# Patient Record
Sex: MALE | Race: WHITE | NOT HISPANIC OR LATINO | Employment: FULL TIME | URBAN - METROPOLITAN AREA
[De-identification: names, ages, dates, MRNs, and addresses within clinical notes are randomized per-mention and may not be internally consistent; named-entity substitution may affect disease eponyms.]

---

## 2017-06-14 ENCOUNTER — RECORDS - HEALTHEAST (OUTPATIENT)
Dept: LAB | Facility: CLINIC | Age: 47
End: 2017-06-14

## 2017-06-14 LAB
AST SERPL W P-5'-P-CCNC: 26 U/L (ref 0–40)
CHOLEST SERPL-MCNC: 278 MG/DL
FASTING STATUS PATIENT QL REPORTED: ABNORMAL
HDLC SERPL-MCNC: 34 MG/DL
LDLC SERPL CALC-MCNC: 209 MG/DL
TRIGL SERPL-MCNC: 176 MG/DL

## 2017-07-20 ENCOUNTER — THERAPY VISIT (OUTPATIENT)
Dept: PHYSICAL THERAPY | Facility: CLINIC | Age: 47
End: 2017-07-20
Payer: OTHER MISCELLANEOUS

## 2017-07-20 DIAGNOSIS — M25.511 ACUTE PAIN OF RIGHT SHOULDER: Primary | ICD-10-CM

## 2017-07-20 PROCEDURE — 97112 NEUROMUSCULAR REEDUCATION: CPT | Mod: GP | Performed by: PHYSICAL THERAPIST

## 2017-07-20 PROCEDURE — 97161 PT EVAL LOW COMPLEX 20 MIN: CPT | Mod: GP | Performed by: PHYSICAL THERAPIST

## 2017-07-20 NOTE — PROGRESS NOTES
[facility] Initial Evaluation    Subjective:    Patient is a 46 year old male presenting with rehab right shoulder hpi. The history is provided by the patient. No  was used.   Fredrick Lockhart is a 46 year old male with a right shoulder condition.  Condition occurred with:  Lifting.  Condition occurred: at work.  This is a new condition  Patient reports onset of right shoulder pain beginning on 6/27/17 when he was lifting a stack of laminations at work that weighed ~20#. He reports that he felt a sharp pain when he got the weight to shoulder level. He reports that he has been wearing a sling for the past 1.5 weeks.  Most recently saw MD for this problem on 6/30/17.    Patient reports pain:  Lateral.    Pain is described as sharp and aching and is intermittent and reported as 1/10 (up to 4/10).  Associated symptoms:  Loss of motion/stiffness and loss of strength. Pain is worse during the day.  Symptoms are exacerbated by using arm overhead, using arm at shoulder level, lifting, certain positions and using arm behind back and relieved by rest and NSAID's.  Since onset symptoms are gradually improving.  Special tests:  X-ray.      General health as reported by patient is good.  Pertinent medical history includes:  Overweight.  Medical allergies: yes (Penicillin).  Other surgeries include:  Other (hernia).  Current medications:  Anti-inflammatory and other (Pravastatin).  Current occupation is   .  Patient is working in normal job with restrictions.  Primary job tasks include:  Prolonged standing, lifting, repetitive tasks, driving and other (assembly).    Barriers include:  None as reported by the patient.    Red flags:  None as reported by the patient.                        Objective:    Standing Alignment:      Shoulder/UE:  Rounded shoulders, scapular abduction L and scapular abduction R                  Flexibility/Screens:     Upper Extremity:    Decreased left upper  extremity flexibility at:  Pectoralis Major    Decreased right upper extremity flexibility present at:  Pectoralis Major                           Shoulder Evaluation:  ROM:  AROM:    Flexion:  Left:  180    Right:  175    Abduction:  Left: 180   Right:  175      External Rotation:  Left:  103    Right:  100            Extension/Internal Rotation:  Left:  T8    Right:  T8          Strength:    Flexion: Left:5/5    Pain: -    Right: 5/5      Pain:  -    Abduction:  Left: 5/5   Pain:-    Right: 5-/5      Pain:+    Internal Rotation:  Left:5/5      Pain:-    Right: 5-/5      Pain:-/+  External Rotation:   Left:5-/5      Pain:-   Right:5-/5      Pain:-/+              Special Tests:  Special tests assessed shoulder: Right: empty can positive; ER lag signs, belly test and lift off negative. All tests negative on left shoulder.    Left shoulder negative for the following special tests:  Impingement  Right shoulder positive for the following special tests:Impingement (Araujo Josh positive; Neer negative)                                       General     ROS    Assessment/Plan:      Patient is a 46 year old male with right side shoulder complaints.    Patient has the following significant findings with corresponding treatment plan.                Diagnosis 1:  Right shoulder pain   P ain -  self management, education and home program  Decreased ROM/flexibility - manual therapy, therapeutic exercise and home program  Decreased strength - therapeutic exercise, therapeutic activities and home program  Impaired muscle performance - neuro re-education and home program  Decreased function - therapeutic activities and home program  Impaired posture - neuro re-education and home program    Therapy Evaluation Codes:   1) History comprised of:   Personal factors that impact the plan of care:      None.    Comorbidity factors that impact the plan of care are:      None.     Medications impacting care: None.  2) Examination of Body  Systems comprised of:   Body structures and functions that impact the plan of care:      Shoulder.   Activity limitations that impact the plan of care are:      Lifting and Reaching.  3) Clinical presentation characteristics are:   Stable/Uncomplicated.  4) Decision-Making    Low complexity using standardized patient assessment instrument and/or measureable assessment of functional outcome.  Cumulative Therapy Evaluation is: Low complexity.    Previous and current functional limitations:  (See Goal Flow Sheet for this information)    Short term and Long term goals: (See Goal Flow Sheet for this information)     Communication ability:  Patient appears to be able to clearly communicate and understand verbal and written communication and follow directions correctly.  Treatment Explanation - The following has been discussed with the patient:   RX ordered/plan of care  Anticipated outcomes  Possible risks and side effects  This patient would benefit from PT intervention to resume normal activities.   Rehab potential is good.    Frequency:  1 X week, once daily  Duration:  for 6 weeks  Discharge Plan:  Achieve all LTG.  Independent in home treatment program.  Reach maximal therapeutic benefit.    Please refer to the daily flowsheet for treatment today, total treatment time and time spent performing 1:1 timed codes.

## 2017-07-20 NOTE — LETTER
[facility] PHYSICAL THERAPY  [address] [address]  [phone]    2017    Re: Fredrick Lockhart   :   1970  MRN:  [id number]   REFERRING PHYSICIAN:   Adelita Sanchez    [facility] PHYSICAL THERAPY    Date of Initial Evaluation:  2017  Visits:  Rxs Used: 1  Reason for Referral:  Acute pain of right shoulder    EVALUATION SUMMARY    [facility] Initial Evaluation    Subjective:  Patient is a 46 year old male presenting with rehab right shoulder hpi. The history is provided by the patient. No  was used. Fredrick Lockhart is a 46 year old male with a right shoulder condition.  Condition occurred with:  Lifting.  Condition occurred: at work.  This is a new condition  Patient reports onset of right shoulder pain beginning on 17 when he was lifting a stack of laminations at work that weighed ~20#. He reports that he felt a sharp pain when he got the weight to shoulder level. He reports that he has been wearing a sling for the past 1.5 weeks.  Most recently saw MD for this problem on 17.  Patient reports pain:  Lateral.    Pain is described as sharp and aching and is intermittent and reported as 1/10 (up to 4/10).  Associated symptoms:  Loss of motion/stiffness and loss of strength. Pain is worse during the day.  Symptoms are exacerbated by using arm overhead, using arm at shoulder level, lifting, certain positions and using arm behind back and relieved by rest and NSAID's.  Since onset symptoms are gradually improving.  Special tests:  X-ray.      General health as reported by patient is good.  Pertinent medical history includes:  Overweight.  Medical allergies: yes (Penicillin).  Other surgeries include:  Other (hernia).  Current medications:  Anti-inflammatory and other (Pravastatin).  Current occupation is .  Patient is working in normal job with restrictions.  Primary job tasks include:  Prolonged standing, lifting,  repetitive tasks, driving and other (assembly).Barriers include:  None as reported by the patient. Red flags:  None as reported by the patient.          Objective:  Standing Alignment:    Shoulder/UE:  Rounded shoulders, scapular abduction L and scapular abduction R  Flexibility/Screens:   Upper Extremity:    Decreased left upper extremity flexibility at:  Pectoralis Major  Decreased right upper extremity flexibility present at:  Pectoralis Major  Shoulder Evaluation:  ROM:  AROM:    Flexion:  Left:  180    Right:  175  Abduction:  Left: 180   Right:  175  External Rotation:  Left:  103    Right:  100  Extension/Internal Rotation:  Left:  T8    Right:  T8    Strength:    Flexion: Left:5/5    Pain: -    Right: 5/5      Pain:  -  Abduction:  Left: 5/5   Pain:-    Right: 5-/5      Pain:+  Internal Rotation:  Left:5/5      Pain:-    Right: 5-/5      Pain:-/+  External Rotation:   Left:5-/5      Pain:-   Right:5-/5      Pain:-/+    Special Tests:  Special tests assessed shoulder: Right: empty can positive; ER lag signs, belly test and lift off negative. All tests negative on left shoulder.  Left shoulder negative for the following special tests:  Impingement  Right shoulder positive for the following special tests:Impingement (Araujo Josh positive; Neer negative)    Assessment/Plan:    Patient is a 46 year old male with right side shoulder complaints.    Patient has the following significant findings with corresponding treatment plan.                Diagnosis 1:  Right shoulder pain   P ain -  self management, education and home program  Decreased ROM/flexibility - manual therapy, therapeutic exercise and home program  Decreased strength - therapeutic exercise, therapeutic activities and home program  Impaired muscle performance - neuro re-education and home program  Decreased function - therapeutic activities and home program  Impaired posture - neuro re-education and home program  Therapy Evaluation Codes:    1) History comprised of:   Personal factors that impact the plan of care:      None.    Comorbidity factors that impact the plan of care are:      None.     Medications impacting care: None.  2) Examination of Body Systems comprised of:   Body structures and functions that impact the plan of care:      Shoulder.   Activity limitations that impact the plan of care are:      Lifting and Reaching.  3) Clinical presentation characteristics are:   Stable/Uncomplicated.  4) Decision-Making    Low complexity using standardized patient assessment instrument and/or measureable assessment of functional outcome.  Cumulative Therapy Evaluation is: Low complexity.  Previous and current functional limitations:  (See Goal Flow Sheet for this information)    Short term and Long term goals: (See Goal Flow Sheet for this information)   Communication ability:  Patient appears to be able to clearly communicate and understand verbal and written communication and follow directions correctly.  Treatment Explanation - The following has been discussed with the patient:   RX ordered/plan of care  Anticipated outcomes  Possible risks and side effects  This patient would benefit from PT intervention to resume normal activities.   Rehab potential is good.  Frequency:  1 X week, once daily  Duration:  for 6 weeks  Discharge Plan:  Achieve all LTG.  Independent in home treatment program.  Reach maximal therapeutic benefit.    Thank you for your referral.    INQUIRIES  Therapist: GEMA Magana [facility] PHYSICAL THERAPY  [address] [address]  Phone: [phone]  Fax: [phone]

## 2017-07-24 ENCOUNTER — TELEPHONE (OUTPATIENT)
Dept: PHYSICAL THERAPY | Facility: CLINIC | Age: 47
End: 2017-07-24

## 2017-07-24 DIAGNOSIS — M25.511 ACUTE PAIN OF RIGHT SHOULDER: ICD-10-CM

## 2017-07-25 ENCOUNTER — THERAPY VISIT (OUTPATIENT)
Dept: PHYSICAL THERAPY | Facility: CLINIC | Age: 47
End: 2017-07-25
Payer: OTHER MISCELLANEOUS

## 2017-07-25 DIAGNOSIS — M25.511 ACUTE PAIN OF RIGHT SHOULDER: ICD-10-CM

## 2017-07-25 PROCEDURE — 97530 THERAPEUTIC ACTIVITIES: CPT | Mod: GP | Performed by: PHYSICAL THERAPIST

## 2017-07-25 PROCEDURE — 97110 THERAPEUTIC EXERCISES: CPT | Mod: GP | Performed by: PHYSICAL THERAPIST

## 2017-08-01 ENCOUNTER — THERAPY VISIT (OUTPATIENT)
Dept: PHYSICAL THERAPY | Facility: CLINIC | Age: 47
End: 2017-08-01
Payer: OTHER MISCELLANEOUS

## 2017-08-01 DIAGNOSIS — M25.511 ACUTE PAIN OF RIGHT SHOULDER: ICD-10-CM

## 2017-08-01 PROCEDURE — 97530 THERAPEUTIC ACTIVITIES: CPT | Mod: GP | Performed by: PHYSICAL THERAPIST

## 2017-08-01 PROCEDURE — 97110 THERAPEUTIC EXERCISES: CPT | Mod: GP | Performed by: PHYSICAL THERAPIST

## 2017-08-08 ENCOUNTER — THERAPY VISIT (OUTPATIENT)
Dept: PHYSICAL THERAPY | Facility: CLINIC | Age: 47
End: 2017-08-08
Payer: OTHER MISCELLANEOUS

## 2017-08-08 DIAGNOSIS — M25.511 ACUTE PAIN OF RIGHT SHOULDER: ICD-10-CM

## 2017-08-08 PROCEDURE — 97530 THERAPEUTIC ACTIVITIES: CPT | Mod: GP | Performed by: PHYSICAL THERAPIST

## 2017-08-08 PROCEDURE — 97110 THERAPEUTIC EXERCISES: CPT | Mod: GP | Performed by: PHYSICAL THERAPIST

## 2017-08-08 NOTE — MR AVS SNAPSHOT
"              After Visit Summary   2017    Fredrick Lockhart    MRN: [id number]           Patient Information     Date Of Birth          1970        Visit Information        Provider Department      2017 7:30 AM Philly Caro PT [facility]        Today's Diagnoses     Acute pain of right shoulder           Follow-ups after your visit        Who to contact     If you have questions or need follow up information about today's clinic visit or your schedule please contact [facility] directly at [phone].  Normal or non-critical lab and imaging results will be communicated to you by [employer], letter or phone within 4 business days after the clinic has received the results. If you do not hear from us within 7 days, please contact the clinic through [employer] or phone. If you have a critical or abnormal lab result, we will notify you by phone as soon as possible.  Submit refill requests through [employer] or call your pharmacy and they will forward the refill request to us. Please allow 3 business days for your refill to be completed.          Additional Information About Your Visit        MyChart Information     [employer] lets you send messages to your doctor, view your test results, renew your prescriptions, schedule appointments and more. To sign up, go to [email or web address] . Click on \"Log in\" on the left side of the screen, which will take you to the Welcome page. Then click on \"Sign up Now\" on the right side of the page.     You will be asked to enter the access code listed below, as well as some personal information. Please follow the directions to create your username and password.     Your access code is: [email or web address]  Expires: 10/23/2017  9:20 AM     Your access code will  in 90 days. If you need help or a new code, please call your [place] clinic or [phone].        Care EveryWhere ID     This is your Care EveryWhere ID. This could be used by " other organizations to access your [employer] medical records  [id number]         Blood Pressure from Last 3 Encounters:   No data found for BP    Weight from Last 3 Encounters:   No data found for Wt              We Performed the Following     BEV PROGRESS NOTES REPORT     THERAPEUTIC ACTIVITIES     THERAPEUTIC EXERCISES        Primary Care Provider Office Phone # Fax #    Dario Mercer [phone]       [facility] [address] [place] [address]        Equal Access to Services     [facility]: Hadii aad ku hadasho Soomaali, waaxda luqadaha, qaybta kaalmada adeegyada, waxay idiin hayaan adeeg kharash la'aan . So [facility] [phone].    ATENCIÓN: Si habla español, tiene a mustafa disposición servicios gratuitos de asistencia lingüística. Llame al [phone].    We comply with applicable federal civil rights laws and [place] laws. We do not discriminate on the basis of race, color, national origin, age, disability sex, sexual orientation or gender identity.            Thank you!     Thank you for choosing [facility]  for your care. Our goal is always to provide you with excellent care. Hearing back from our patients is one way we can continue to improve our services. Please take a few minutes to complete the written survey that you may receive in the mail after your visit with us. Thank you!             Your Updated Medication List - Protect others around you: Learn how to safely use, store and throw away your medicines at www.disposemymeds.org.      Notice  As of 8/8/2017  8:32 AM    You have not been prescribed any medications.

## 2017-08-08 NOTE — PROGRESS NOTES
Subjective:    HPI                    Objective:    System    Physical Exam    General     ROS    Assessment/Plan:      DISCHARGE REPORT    Progress reporting period is from 07/20/17 to 08/08/17.       SUBJECTIVE  Subjective changes noted by patient:  Patient states overall his shoulder feels really well.  Only a little stiffness in the morning. Not on any restrictions at work.  Feels ready to have today be his last day of PT     Current Pain level: 0/10.     Initial Pain level: 4/10.   Changes in function:  Yes (See Goal flowsheet attached for changes in current functional level)  Adverse reaction to treatment or activity: None    OBJECTIVE  Changes noted in objective findings:  Yes, improved AROM and strength  AROM:  flex, abd, ext/IR, hor add, flex/ER all WNL and (-).    PROM all full and (-).    MMT:  shoulder: all 5/5 and (-).    Patient able to lift 20 pounds to upper cupboard with both arms pain free and 10 pounds with right arm overhead to upper cupboard pain free      ASSESSMENT/PLAN  Updated problem list and treatment plan: Diagnosis 1:  Right Shoulder Pain  Decreased function - home program  STG/LTGs have been met or progress has been made towards goals:  Yes (See Goal flow sheet completed today.)  Assessment of Progress: The patient's condition is improving.  Patient is meeting short term goals and is progressing towards long term goals.  Self Management Plans:  Patient has been instructed in a home treatment program.  Patient is independent in a home treatment program.  Patient  has been instructed in self management of symptoms.  Patient is independent in self management of symptoms.  I have re-evaluated this patient and find that the nature, scope, duration and intensity of the therapy is appropriate for the medical condition of the patient.  Fredrick continues to require the following intervention to meet STG and LTG's:  PT intervention is no longer required to meet STG/LTG.    Recommendations:  This  patient is ready to be discharged from therapy and continue their home treatment program.    Please refer to the daily flowsheet for treatment today, total treatment time and time spent performing 1:1 timed codes.

## 2018-06-13 ENCOUNTER — RECORDS - HEALTHEAST (OUTPATIENT)
Dept: LAB | Facility: CLINIC | Age: 48
End: 2018-06-13

## 2018-06-14 LAB
ALBUMIN SERPL-MCNC: 4.2 G/DL (ref 3.5–5)
ALP SERPL-CCNC: 79 U/L (ref 45–120)
ALT SERPL W P-5'-P-CCNC: 29 U/L (ref 0–45)
ANION GAP SERPL CALCULATED.3IONS-SCNC: 10 MMOL/L (ref 5–18)
AST SERPL W P-5'-P-CCNC: 21 U/L (ref 0–40)
BILIRUB SERPL-MCNC: 0.3 MG/DL (ref 0–1)
BUN SERPL-MCNC: 21 MG/DL (ref 8–22)
CALCIUM SERPL-MCNC: 10 MG/DL (ref 8.5–10.5)
CHLORIDE BLD-SCNC: 105 MMOL/L (ref 98–107)
CHOLEST SERPL-MCNC: 235 MG/DL
CO2 SERPL-SCNC: 27 MMOL/L (ref 22–31)
CREAT SERPL-MCNC: 0.95 MG/DL (ref 0.7–1.3)
FASTING STATUS PATIENT QL REPORTED: ABNORMAL
GFR SERPL CREATININE-BSD FRML MDRD: >60 ML/MIN/1.73M2
GLUCOSE BLD-MCNC: 81 MG/DL (ref 70–125)
HDLC SERPL-MCNC: 33 MG/DL
LDLC SERPL CALC-MCNC: 146 MG/DL
POTASSIUM BLD-SCNC: 4.6 MMOL/L (ref 3.5–5)
PROT SERPL-MCNC: 7 G/DL (ref 6–8)
SODIUM SERPL-SCNC: 142 MMOL/L (ref 136–145)
TRIGL SERPL-MCNC: 280 MG/DL

## 2019-02-21 NOTE — TELEPHONE ENCOUNTER
I received notification that WC adjustor Rajwinder at [facility] would authorize 6 PT visits, but was not in agreement with the recommended frequency & duration and wanted the patient to be seen 2x/week for 3 weeks. I spoke with Rajwinder and explained my rationale for the frequency based on how often I felt the patient needed to be seen in clinic (along with participation in home exercises) and that I didn't feel the 3 weeks was a long enough duration for the necessary strengthening and muscle re-training to occur. After discussion, she agreed to 1x/week for 6 weeks duration.     (4) no impairment

## 2020-08-05 ENCOUNTER — SURGERY - HEALTHEAST (OUTPATIENT)
Dept: CARDIOLOGY | Facility: CLINIC | Age: 50
End: 2020-08-05

## 2020-08-05 ASSESSMENT — MIFFLIN-ST. JEOR: SCORE: 1876.84

## 2020-08-06 ASSESSMENT — MIFFLIN-ST. JEOR
SCORE: 1810.16
SCORE: 1866.41

## 2020-08-07 ASSESSMENT — MIFFLIN-ST. JEOR: SCORE: 1805.63

## 2020-08-08 ENCOUNTER — COMMUNICATION - HEALTHEAST (OUTPATIENT)
Dept: SCHEDULING | Facility: CLINIC | Age: 50
End: 2020-08-08

## 2020-08-10 ENCOUNTER — COMMUNICATION - HEALTHEAST (OUTPATIENT)
Dept: CARDIOLOGY | Facility: CLINIC | Age: 50
End: 2020-08-10

## 2020-08-10 ENCOUNTER — AMBULATORY - HEALTHEAST (OUTPATIENT)
Dept: CARDIAC REHAB | Facility: CLINIC | Age: 50
End: 2020-08-10

## 2020-08-10 DIAGNOSIS — I48.91 ATRIAL FIBRILLATION (H): ICD-10-CM

## 2020-08-10 DIAGNOSIS — Z95.5 STENTED CORONARY ARTERY: ICD-10-CM

## 2020-08-11 ENCOUNTER — AMBULATORY - HEALTHEAST (OUTPATIENT)
Dept: CARDIAC REHAB | Facility: HOSPITAL | Age: 50
End: 2020-08-11

## 2020-08-11 DIAGNOSIS — Z95.5 STENTED CORONARY ARTERY: ICD-10-CM

## 2020-08-11 DIAGNOSIS — I20.0 UNSTABLE ANGINA PECTORIS (H): ICD-10-CM

## 2020-08-13 ENCOUNTER — COMMUNICATION - HEALTHEAST (OUTPATIENT)
Dept: CARDIOLOGY | Facility: CLINIC | Age: 50
End: 2020-08-13

## 2020-08-13 ENCOUNTER — RECORDS - HEALTHEAST (OUTPATIENT)
Dept: ADMINISTRATIVE | Facility: OTHER | Age: 50
End: 2020-08-13

## 2020-08-13 ENCOUNTER — AMBULATORY - HEALTHEAST (OUTPATIENT)
Dept: CARDIOLOGY | Facility: CLINIC | Age: 50
End: 2020-08-13

## 2020-08-13 ENCOUNTER — HOSPITAL ENCOUNTER (OUTPATIENT)
Dept: CARDIOLOGY | Facility: HOSPITAL | Age: 50
Discharge: HOME | End: 2020-08-13
Attending: INTERNAL MEDICINE

## 2020-08-13 DIAGNOSIS — I48.91 ATRIAL FIBRILLATION (H): ICD-10-CM

## 2020-08-14 ENCOUNTER — AMBULATORY - HEALTHEAST (OUTPATIENT)
Dept: CARDIAC REHAB | Facility: HOSPITAL | Age: 50
End: 2020-08-14

## 2020-08-14 DIAGNOSIS — I21.11 ST ELEVATION MYOCARDIAL INFARCTION INVOLVING RIGHT CORONARY ARTERY (H): ICD-10-CM

## 2020-08-14 DIAGNOSIS — Z95.5 STENTED CORONARY ARTERY: ICD-10-CM

## 2020-08-17 ENCOUNTER — OFFICE VISIT - HEALTHEAST (OUTPATIENT)
Dept: CARDIOLOGY | Facility: CLINIC | Age: 50
End: 2020-08-17

## 2020-08-17 ENCOUNTER — AMBULATORY - HEALTHEAST (OUTPATIENT)
Dept: CARDIAC REHAB | Facility: HOSPITAL | Age: 50
End: 2020-08-17

## 2020-08-17 DIAGNOSIS — I10 PRIMARY HYPERTENSION: ICD-10-CM

## 2020-08-17 DIAGNOSIS — I10 ESSENTIAL HYPERTENSION: ICD-10-CM

## 2020-08-17 DIAGNOSIS — I21.11 ST ELEVATION MYOCARDIAL INFARCTION INVOLVING RIGHT CORONARY ARTERY (H): ICD-10-CM

## 2020-08-17 DIAGNOSIS — I20.0 UNSTABLE ANGINA PECTORIS (H): ICD-10-CM

## 2020-08-17 DIAGNOSIS — E78.5 DYSLIPIDEMIA: ICD-10-CM

## 2020-08-17 DIAGNOSIS — I25.10 CORONARY ARTERY DISEASE INVOLVING NATIVE CORONARY ARTERY OF NATIVE HEART, ANGINA PRESENCE UNSPECIFIED: ICD-10-CM

## 2020-08-17 DIAGNOSIS — Z95.5 STENTED CORONARY ARTERY: ICD-10-CM

## 2020-08-17 LAB
ANION GAP SERPL CALCULATED.3IONS-SCNC: 10 MMOL/L (ref 5–18)
BUN SERPL-MCNC: 15 MG/DL (ref 8–22)
CALCIUM SERPL-MCNC: 9.6 MG/DL (ref 8.5–10.5)
CHLORIDE BLD-SCNC: 102 MMOL/L (ref 98–107)
CO2 SERPL-SCNC: 26 MMOL/L (ref 22–31)
CREAT SERPL-MCNC: 0.92 MG/DL (ref 0.7–1.3)
GFR SERPL CREATININE-BSD FRML MDRD: >60 ML/MIN/1.73M2
GLUCOSE BLD-MCNC: 103 MG/DL (ref 70–125)
POTASSIUM BLD-SCNC: 4.6 MMOL/L (ref 3.5–5)
SODIUM SERPL-SCNC: 138 MMOL/L (ref 136–145)

## 2020-08-17 RX ORDER — METOPROLOL TARTRATE 25 MG/1
25 TABLET, FILM COATED ORAL 2 TIMES DAILY
Qty: 180 TABLET | Refills: 3 | Status: SHIPPED | OUTPATIENT
Start: 2020-08-17 | End: 2021-09-07

## 2020-08-17 ASSESSMENT — MIFFLIN-ST. JEOR: SCORE: 1808.8

## 2020-08-19 ENCOUNTER — AMBULATORY - HEALTHEAST (OUTPATIENT)
Dept: CARDIAC REHAB | Facility: HOSPITAL | Age: 50
End: 2020-08-19

## 2020-08-19 DIAGNOSIS — Z95.5 STENTED CORONARY ARTERY: ICD-10-CM

## 2020-08-19 DIAGNOSIS — I21.11 ST ELEVATION MYOCARDIAL INFARCTION INVOLVING RIGHT CORONARY ARTERY (H): ICD-10-CM

## 2020-08-21 ENCOUNTER — AMBULATORY - HEALTHEAST (OUTPATIENT)
Dept: CARDIAC REHAB | Facility: HOSPITAL | Age: 50
End: 2020-08-21

## 2020-08-21 DIAGNOSIS — I21.11 ST ELEVATION MYOCARDIAL INFARCTION INVOLVING RIGHT CORONARY ARTERY (H): ICD-10-CM

## 2020-08-21 DIAGNOSIS — Z95.5 STENTED CORONARY ARTERY: ICD-10-CM

## 2020-08-24 ENCOUNTER — AMBULATORY - HEALTHEAST (OUTPATIENT)
Dept: CARDIAC REHAB | Facility: HOSPITAL | Age: 50
End: 2020-08-24

## 2020-08-24 DIAGNOSIS — I21.11 ST ELEVATION MYOCARDIAL INFARCTION INVOLVING RIGHT CORONARY ARTERY (H): ICD-10-CM

## 2020-08-24 DIAGNOSIS — Z95.5 STENTED CORONARY ARTERY: ICD-10-CM

## 2020-08-26 ENCOUNTER — AMBULATORY - HEALTHEAST (OUTPATIENT)
Dept: CARDIAC REHAB | Facility: HOSPITAL | Age: 50
End: 2020-08-26

## 2020-08-26 DIAGNOSIS — I21.11 ST ELEVATION MYOCARDIAL INFARCTION INVOLVING RIGHT CORONARY ARTERY (H): ICD-10-CM

## 2020-08-26 DIAGNOSIS — Z95.5 STENTED CORONARY ARTERY: ICD-10-CM

## 2020-08-31 ENCOUNTER — AMBULATORY - HEALTHEAST (OUTPATIENT)
Dept: CARDIAC REHAB | Facility: HOSPITAL | Age: 50
End: 2020-08-31

## 2020-08-31 DIAGNOSIS — I21.11 ST ELEVATION MYOCARDIAL INFARCTION INVOLVING RIGHT CORONARY ARTERY (H): ICD-10-CM

## 2020-08-31 DIAGNOSIS — Z95.5 STENTED CORONARY ARTERY: ICD-10-CM

## 2020-09-02 ENCOUNTER — AMBULATORY - HEALTHEAST (OUTPATIENT)
Dept: CARDIAC REHAB | Facility: HOSPITAL | Age: 50
End: 2020-09-02

## 2020-09-02 DIAGNOSIS — I21.11 ST ELEVATION MYOCARDIAL INFARCTION INVOLVING RIGHT CORONARY ARTERY (H): ICD-10-CM

## 2020-09-02 DIAGNOSIS — Z95.5 STENTED CORONARY ARTERY: ICD-10-CM

## 2020-09-09 ENCOUNTER — AMBULATORY - HEALTHEAST (OUTPATIENT)
Dept: CARDIAC REHAB | Facility: HOSPITAL | Age: 50
End: 2020-09-09

## 2020-09-09 DIAGNOSIS — Z95.5 STENTED CORONARY ARTERY: ICD-10-CM

## 2020-09-09 DIAGNOSIS — I21.11 ST ELEVATION MYOCARDIAL INFARCTION INVOLVING RIGHT CORONARY ARTERY (H): ICD-10-CM

## 2020-09-14 ENCOUNTER — AMBULATORY - HEALTHEAST (OUTPATIENT)
Dept: CARDIAC REHAB | Facility: HOSPITAL | Age: 50
End: 2020-09-14

## 2020-09-14 DIAGNOSIS — I21.11 ST ELEVATION MYOCARDIAL INFARCTION INVOLVING RIGHT CORONARY ARTERY (H): ICD-10-CM

## 2020-09-14 DIAGNOSIS — Z95.5 STENTED CORONARY ARTERY: ICD-10-CM

## 2020-09-16 ENCOUNTER — AMBULATORY - HEALTHEAST (OUTPATIENT)
Dept: CARDIAC REHAB | Facility: HOSPITAL | Age: 50
End: 2020-09-16

## 2020-09-16 DIAGNOSIS — Z95.5 STENTED CORONARY ARTERY: ICD-10-CM

## 2020-09-16 DIAGNOSIS — I21.11 ST ELEVATION MYOCARDIAL INFARCTION INVOLVING RIGHT CORONARY ARTERY (H): ICD-10-CM

## 2020-09-21 ENCOUNTER — AMBULATORY - HEALTHEAST (OUTPATIENT)
Dept: CARDIAC REHAB | Facility: HOSPITAL | Age: 50
End: 2020-09-21

## 2020-09-21 DIAGNOSIS — Z95.5 STENTED CORONARY ARTERY: ICD-10-CM

## 2020-09-21 DIAGNOSIS — I21.11 ST ELEVATION MYOCARDIAL INFARCTION INVOLVING RIGHT CORONARY ARTERY (H): ICD-10-CM

## 2020-09-23 ENCOUNTER — AMBULATORY - HEALTHEAST (OUTPATIENT)
Dept: CARDIAC REHAB | Facility: HOSPITAL | Age: 50
End: 2020-09-23

## 2020-09-23 DIAGNOSIS — Z95.5 STENTED CORONARY ARTERY: ICD-10-CM

## 2020-09-23 DIAGNOSIS — I21.11 ST ELEVATION MYOCARDIAL INFARCTION INVOLVING RIGHT CORONARY ARTERY (H): ICD-10-CM

## 2020-09-25 ENCOUNTER — COMMUNICATION - HEALTHEAST (OUTPATIENT)
Dept: CARDIOLOGY | Facility: CLINIC | Age: 50
End: 2020-09-25

## 2020-09-28 ENCOUNTER — OFFICE VISIT - HEALTHEAST (OUTPATIENT)
Dept: CARDIOLOGY | Facility: CLINIC | Age: 50
End: 2020-09-28

## 2020-09-28 DIAGNOSIS — I25.10 CORONARY ARTERY DISEASE INVOLVING NATIVE CORONARY ARTERY OF NATIVE HEART WITHOUT ANGINA PECTORIS: ICD-10-CM

## 2020-09-28 ASSESSMENT — MIFFLIN-ST. JEOR: SCORE: 1808.8

## 2020-09-29 ENCOUNTER — AMBULATORY - HEALTHEAST (OUTPATIENT)
Dept: CARDIAC REHAB | Facility: HOSPITAL | Age: 50
End: 2020-09-29

## 2020-09-29 DIAGNOSIS — Z95.5 STENTED CORONARY ARTERY: ICD-10-CM

## 2020-09-29 DIAGNOSIS — I21.11 ST ELEVATION MYOCARDIAL INFARCTION INVOLVING RIGHT CORONARY ARTERY (H): ICD-10-CM

## 2020-10-01 ENCOUNTER — AMBULATORY - HEALTHEAST (OUTPATIENT)
Dept: CARDIAC REHAB | Facility: HOSPITAL | Age: 50
End: 2020-10-01

## 2020-10-01 DIAGNOSIS — I21.11 ST ELEVATION MYOCARDIAL INFARCTION INVOLVING RIGHT CORONARY ARTERY (H): ICD-10-CM

## 2020-10-01 DIAGNOSIS — Z95.5 STENTED CORONARY ARTERY: ICD-10-CM

## 2020-10-06 ENCOUNTER — AMBULATORY - HEALTHEAST (OUTPATIENT)
Dept: CARDIAC REHAB | Facility: HOSPITAL | Age: 50
End: 2020-10-06

## 2020-10-06 DIAGNOSIS — Z95.5 STENTED CORONARY ARTERY: ICD-10-CM

## 2020-10-06 DIAGNOSIS — I21.11 ST ELEVATION MYOCARDIAL INFARCTION INVOLVING RIGHT CORONARY ARTERY (H): ICD-10-CM

## 2020-10-08 ENCOUNTER — AMBULATORY - HEALTHEAST (OUTPATIENT)
Dept: CARDIAC REHAB | Facility: HOSPITAL | Age: 50
End: 2020-10-08

## 2020-10-08 DIAGNOSIS — I21.11 ST ELEVATION MYOCARDIAL INFARCTION INVOLVING RIGHT CORONARY ARTERY (H): ICD-10-CM

## 2020-10-08 DIAGNOSIS — Z95.5 STENTED CORONARY ARTERY: ICD-10-CM

## 2020-10-16 ENCOUNTER — COMMUNICATION - HEALTHEAST (OUTPATIENT)
Dept: SCHEDULING | Facility: CLINIC | Age: 50
End: 2020-10-16

## 2020-11-12 ENCOUNTER — RECORDS - HEALTHEAST (OUTPATIENT)
Dept: LAB | Facility: CLINIC | Age: 50
End: 2020-11-12

## 2020-11-12 LAB
ALBUMIN SERPL-MCNC: 4.5 G/DL (ref 3.5–5)
ALP SERPL-CCNC: 121 U/L (ref 45–120)
ALT SERPL W P-5'-P-CCNC: 128 U/L (ref 0–45)
ANION GAP SERPL CALCULATED.3IONS-SCNC: 8 MMOL/L (ref 5–18)
AST SERPL W P-5'-P-CCNC: 47 U/L (ref 0–40)
BILIRUB SERPL-MCNC: 0.5 MG/DL (ref 0–1)
BUN SERPL-MCNC: 15 MG/DL (ref 8–22)
CALCIUM SERPL-MCNC: 9.5 MG/DL (ref 8.5–10.5)
CHLORIDE BLD-SCNC: 106 MMOL/L (ref 98–107)
CHOLEST SERPL-MCNC: 146 MG/DL
CO2 SERPL-SCNC: 27 MMOL/L (ref 22–31)
CREAT SERPL-MCNC: 1 MG/DL (ref 0.7–1.3)
FASTING STATUS PATIENT QL REPORTED: YES
GFR SERPL CREATININE-BSD FRML MDRD: >60 ML/MIN/1.73M2
GLUCOSE BLD-MCNC: 105 MG/DL (ref 70–125)
HDLC SERPL-MCNC: 33 MG/DL
LDLC SERPL CALC-MCNC: 97 MG/DL
POTASSIUM BLD-SCNC: 4.9 MMOL/L (ref 3.5–5)
PROT SERPL-MCNC: 7.2 G/DL (ref 6–8)
PSA SERPL-MCNC: 0.8 NG/ML (ref 0–3.5)
SODIUM SERPL-SCNC: 141 MMOL/L (ref 136–145)
TRIGL SERPL-MCNC: 82 MG/DL

## 2020-12-29 ENCOUNTER — RECORDS - HEALTHEAST (OUTPATIENT)
Dept: LAB | Facility: CLINIC | Age: 50
End: 2020-12-29

## 2020-12-29 LAB
ALBUMIN SERPL-MCNC: 4.2 G/DL (ref 3.5–5)
ALP SERPL-CCNC: 99 U/L (ref 45–120)
ALT SERPL W P-5'-P-CCNC: 82 U/L (ref 0–45)
AST SERPL W P-5'-P-CCNC: 41 U/L (ref 0–40)
BILIRUB DIRECT SERPL-MCNC: 0.1 MG/DL
BILIRUB SERPL-MCNC: 0.4 MG/DL (ref 0–1)
PROT SERPL-MCNC: 7.2 G/DL (ref 6–8)

## 2021-03-11 ENCOUNTER — AMBULATORY - HEALTHEAST (OUTPATIENT)
Dept: CARDIOLOGY | Facility: CLINIC | Age: 51
End: 2021-03-11

## 2021-03-18 ENCOUNTER — COMMUNICATION - HEALTHEAST (OUTPATIENT)
Dept: CARDIOLOGY | Facility: CLINIC | Age: 51
End: 2021-03-18

## 2021-03-19 ENCOUNTER — OFFICE VISIT - HEALTHEAST (OUTPATIENT)
Dept: CARDIOLOGY | Facility: CLINIC | Age: 51
End: 2021-03-19

## 2021-03-19 DIAGNOSIS — E78.5 DYSLIPIDEMIA: ICD-10-CM

## 2021-03-19 LAB
ALBUMIN SERPL-MCNC: 4.4 G/DL (ref 3.5–5)
ALP SERPL-CCNC: 94 U/L (ref 45–120)
ALT SERPL W P-5'-P-CCNC: 66 U/L (ref 0–45)
AST SERPL W P-5'-P-CCNC: 30 U/L (ref 0–40)
BILIRUB DIRECT SERPL-MCNC: 0.3 MG/DL
BILIRUB SERPL-MCNC: 1 MG/DL (ref 0–1)
CHOLEST SERPL-MCNC: 176 MG/DL
FASTING STATUS PATIENT QL REPORTED: YES
HDLC SERPL-MCNC: 34 MG/DL
LDLC SERPL CALC-MCNC: 114 MG/DL
PROT SERPL-MCNC: 7.7 G/DL (ref 6–8)
TRIGL SERPL-MCNC: 142 MG/DL

## 2021-03-19 ASSESSMENT — MIFFLIN-ST. JEOR: SCORE: 1900.43

## 2021-04-01 ENCOUNTER — AMBULATORY - HEALTHEAST (OUTPATIENT)
Dept: CARDIOLOGY | Facility: CLINIC | Age: 51
End: 2021-04-01

## 2021-04-01 DIAGNOSIS — I25.10 CORONARY ARTERY DISEASE INVOLVING NATIVE CORONARY ARTERY OF NATIVE HEART, ANGINA PRESENCE UNSPECIFIED: ICD-10-CM

## 2021-04-01 DIAGNOSIS — E78.5 DYSLIPIDEMIA: ICD-10-CM

## 2021-04-01 DIAGNOSIS — I21.11 ST ELEVATION MYOCARDIAL INFARCTION INVOLVING RIGHT CORONARY ARTERY (H): ICD-10-CM

## 2021-04-01 RX ORDER — ROSUVASTATIN CALCIUM 20 MG/1
20 TABLET, COATED ORAL AT BEDTIME
Qty: 90 TABLET | Refills: 3 | Status: SHIPPED | OUTPATIENT
Start: 2021-04-01 | End: 2021-09-15

## 2021-05-05 ENCOUNTER — RECORDS - HEALTHEAST (OUTPATIENT)
Dept: LAB | Facility: CLINIC | Age: 51
End: 2021-05-05

## 2021-05-05 LAB
ALBUMIN SERPL-MCNC: 4.4 G/DL (ref 3.5–5)
ALP SERPL-CCNC: 94 U/L (ref 45–120)
ALT SERPL W P-5'-P-CCNC: 57 U/L (ref 0–45)
ANION GAP SERPL CALCULATED.3IONS-SCNC: 8 MMOL/L (ref 5–18)
AST SERPL W P-5'-P-CCNC: 31 U/L (ref 0–40)
BILIRUB SERPL-MCNC: 0.5 MG/DL (ref 0–1)
BUN SERPL-MCNC: 16 MG/DL (ref 8–22)
CALCIUM SERPL-MCNC: 9.4 MG/DL (ref 8.5–10.5)
CHLORIDE BLD-SCNC: 105 MMOL/L (ref 98–107)
CHOLEST SERPL-MCNC: 149 MG/DL
CO2 SERPL-SCNC: 29 MMOL/L (ref 22–31)
CREAT SERPL-MCNC: 0.96 MG/DL (ref 0.7–1.3)
FASTING STATUS PATIENT QL REPORTED: YES
GFR SERPL CREATININE-BSD FRML MDRD: >60 ML/MIN/1.73M2
GLUCOSE BLD-MCNC: 101 MG/DL (ref 70–125)
HDLC SERPL-MCNC: 38 MG/DL
LDLC SERPL CALC-MCNC: 90 MG/DL
POTASSIUM BLD-SCNC: 4.7 MMOL/L (ref 3.5–5)
PROT SERPL-MCNC: 7.3 G/DL (ref 6–8)
SODIUM SERPL-SCNC: 142 MMOL/L (ref 136–145)
TRIGL SERPL-MCNC: 107 MG/DL

## 2021-05-21 ENCOUNTER — RECORDS - HEALTHEAST (OUTPATIENT)
Dept: CARDIOLOGY | Facility: CLINIC | Age: 51
End: 2021-05-21

## 2021-05-21 ENCOUNTER — RECORDS - HEALTHEAST (OUTPATIENT)
Dept: ADMINISTRATIVE | Facility: OTHER | Age: 51
End: 2021-05-21

## 2021-05-24 ENCOUNTER — RECORDS - HEALTHEAST (OUTPATIENT)
Dept: ADMINISTRATIVE | Facility: CLINIC | Age: 51
End: 2021-05-24

## 2021-06-04 VITALS — WEIGHT: 212 LBS | BODY MASS INDEX: 30.42 KG/M2

## 2021-06-04 VITALS
RESPIRATION RATE: 16 BRPM | BODY MASS INDEX: 30.06 KG/M2 | DIASTOLIC BLOOD PRESSURE: 64 MMHG | HEART RATE: 64 BPM | SYSTOLIC BLOOD PRESSURE: 98 MMHG | HEIGHT: 70 IN | WEIGHT: 210 LBS

## 2021-06-04 VITALS — HEIGHT: 70 IN | BODY MASS INDEX: 29.96 KG/M2 | WEIGHT: 209.3 LBS

## 2021-06-04 VITALS — WEIGHT: 211 LBS | BODY MASS INDEX: 30.28 KG/M2

## 2021-06-04 VITALS
HEIGHT: 70 IN | RESPIRATION RATE: 10 BRPM | WEIGHT: 210 LBS | SYSTOLIC BLOOD PRESSURE: 98 MMHG | BODY MASS INDEX: 30.06 KG/M2 | HEART RATE: 56 BPM | DIASTOLIC BLOOD PRESSURE: 54 MMHG

## 2021-06-04 VITALS — BODY MASS INDEX: 29.7 KG/M2 | WEIGHT: 207 LBS

## 2021-06-04 VITALS — WEIGHT: 208 LBS | BODY MASS INDEX: 29.84 KG/M2

## 2021-06-04 VITALS — BODY MASS INDEX: 30.42 KG/M2 | WEIGHT: 212 LBS

## 2021-06-04 VITALS — BODY MASS INDEX: 29.56 KG/M2 | WEIGHT: 206 LBS

## 2021-06-04 VITALS — WEIGHT: 210 LBS | BODY MASS INDEX: 30.13 KG/M2

## 2021-06-04 VITALS — WEIGHT: 206 LBS | BODY MASS INDEX: 29.56 KG/M2

## 2021-06-04 VITALS — WEIGHT: 207 LBS | BODY MASS INDEX: 29.7 KG/M2

## 2021-06-04 VITALS — BODY MASS INDEX: 30.13 KG/M2 | WEIGHT: 210 LBS

## 2021-06-04 VITALS — BODY MASS INDEX: 29.99 KG/M2 | WEIGHT: 209 LBS

## 2021-06-04 VITALS — WEIGHT: 209 LBS | BODY MASS INDEX: 29.99 KG/M2

## 2021-06-05 VITALS
WEIGHT: 230.2 LBS | BODY MASS INDEX: 32.96 KG/M2 | HEIGHT: 70 IN | DIASTOLIC BLOOD PRESSURE: 78 MMHG | SYSTOLIC BLOOD PRESSURE: 126 MMHG | HEART RATE: 64 BPM | RESPIRATION RATE: 16 BRPM

## 2021-06-10 NOTE — TELEPHONE ENCOUNTER
----- Message from Silke Faustin MD sent at 8/7/2020  9:42 AM CDT -----  Fredrick will also need a 30 day monitor after discharge to look for afib.    Thanks, EG

## 2021-06-10 NOTE — PROGRESS NOTES
ITP ASSESSMENT   Assessment Day: Initial    Session Number: 1-2  Precautions: S/P MI    Diagnosis: MI;Stent    Risk Stratification: High    Referring Provider: Norma Betancourt MD  EXERCISE  Exercise Assessment: Initial       6 Minute Walk Test   Pre   Pre Exercise HR: 62                    Pre Exercise BP: 129/78      Peak  Peak HR: 72                   Peak BP: 125/84    Peak feet: 1000    Peak O2 SAT: 97    Peak RPE: 4    Peak MPH: 1.89      Symptoms:  Peak Symptoms: Fatigue      5 mins. 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 MET level goal of 6-7    Education Goals: All goals in this section met    Education Goals Met: Patient can state cardiac s/s and appropriate emergency response.;Has system for taking medication.;Medication review.      Exercise Prescription  Exercise Mode: Treadmill;Bike;Nustep;Arm Erg.;Hallway Walking    Frequency: 2/week    Duration: 40 minutes    Intensity / THR: 20-30 beats above resting heart rate    RPE 11-14  Progression / Met level: 2.5-3.5    Resistive Training?: Yes      Current Exercise (mins/week): 1      Interventions  Home Exercise:  Mode: Walk    Frequency: 3-4x/week    Duration: 20-30 minutes      Education Material : Educational videos;Provide written material;Individual education and counseling;Offer educational classes      Education Completed  Exercise Education Completed: Cardiac Anatomy;Signs and Symptoms;Medication review;RPE;Emergency Plan;Home Exercise;Warm up/cool down;FITT Principles;BP/HR Reponse to exercise;Benefits of Exercise;End point of exercise              Exercise Follow-up/Discharge  Follow up/Discharge: Skilled therapy needed for pt S/P MI to monitor for CV changes with increases in exercise  "intensity, to educate and reinforce risk factor education. Pt will benefit from support from staff to remain tobacco free, quit with event   NUTRITION  Nutrition Assessment: Initial      Nutrition Risk Factors:  Nutrition Risk Factors: Dyslipidemia;Overweight  Cholesterol: 309  LDL: 201  HDL: 31  Triglycerides: 238      Nutrition Plan  Interventions    Other Nutrition Intervention: Diet Class;Educational Videos;Therapist/Pt Discussion;Provide with Written Material      Education Completed  Nutrition Education Completed: Low Saturated fat diet;Risk factor overview;Low sodium diet;Weight management      Goals  Nutrition Goals (Next 30 days): Patient will follow a low sodium diet;Patient will follow a low saturated fat diet;Patient knows appropriate portion size;Patient will lose weight      Goals Met  Nutrition Goals Met: Patient can identify their risk factors for CAD;Completed Nutritional Risk Screen;Provided Rate your Plate Survey;Reviewed Dietitian schedule      Height, Weight, and  BMI  Weight: 210 lb (95.3 kg)  Height: 5' 10\" (1.778 m)  BMI: 30.13      Nutrition Follow-up  Follow-up/Discharge: Completed diet habit survey         Other Risk Factors  Other Risk Factor Assessment: Initial      HTN Risk Factor: Hypertension      Pre Exercise BP: 129/78  Post Exercise BP: 128/77      Hypertension Plan  Goals  HTN Goals: Follow low sodium diet;Exercises regularly      Goals Met  HTN Goals Met: Take medication as prescribed      HTN Interventions  HTN Interventions: Diet consult;Therapist/patient discussion;Provide written material;Offer educational videos;Offer educational classes      HTN Education Completed  HTN Education Completed: Low sodium diet;Medication review;Risk factor overview      Tobacco Risk Factor: Tobacco      Initial Use:: <1 ppd  Current Use:: None      Tobacco Plan  Tobacco Goals  Tobacco Goals: Patient remain tobacco free      Goals Met  Tobacco Goals Met: Patient remain tobacco free      Tobacco " Interventions  Tobacco Interventions: Therapist/patient discussion;Provide written material;Offer educational videos;Offer class on risk factor modification      Tobacco Education Completed  Tobacco Education Completed: Identifies triggers;Health benefits of tobacco cessation;Risk factor overview      Risk Factor Follow-up   Follow-up/Discharge: Quit with event, enc pt to reach out for additional resources if needed     PSYCHOSOCIAL  Psychosocial Assessment: Initial       [facility] Q of L Summary Score: 24      PHQ-9 Total Score: 4      Psychosocial Risk Factor: Stress      Psychosocial Plan  Interventions  If PHQ-9 is >9, send letter to MD  Interventions: Offer Spiritual Care consult;Offer educational videos and classes;Provide written material;Individual education and counseling       Education Completed  Education Completed: Relaxation/Coping Techniques;Effects of stress on body      Goals  Goals (Next 30 days): Practicing stress management skills      Goals Met  Goals Met: Identified Support system;Oriented to stress management classes;Identify stressors      Psychosocial Follow-up  Follow-up/Discharge: Pt reports he typically smokes cigarettes when feeling stressed, will explore other stress mgmt options             Patient involved in Goal setting?: Yes      Signature: _____________________________________________________________    Date: __________________    Time: __________________

## 2021-06-10 NOTE — PATIENT INSTRUCTIONS - HE
Fredrick Lockhart,    It was a pleasure to see you today at the [facility].     My recommendations after this visit include:  - Your lab results will be on MyChart.    - Please schedule an appointment with sleep medicine.     Elisha Duong CNP      Medication     o Take all your medications as prescribed  o Do not stop any medications without talking with a healthcare provider    Exercise      o Physical activity is important for overall health  o Set a goal of 150 minutes of exercise each week  o For example, 30 minutes of exercise 5 days each week.    o These 30 minutes can be broken into shorter periods of 15 minutes twice daily or 10 minutes three times daily  o Start any exercise program slowly and work towards the goal of 150 minutes each week  o For example, you may start with 10 minutes and plan to add a few minutes each week as you get stronger   o Examples of exercise include walking, swimming, or biking  o Remember to stretch and stay hydrated with exercise    Diet     o A heart healthy diet includes:  o A variety of fruits and vegetables  o Whole grains  o Low-fat dairy (fat-free, 1% fat, and low-fat)  o Lean meats and poultry without skin   o Fish (eat fish 2 times each week)  o Nuts  o Limit saturated fat to about 13 grams each day (based on a 2000 calorie diet)  o Limit red meat  o Limit sugars (sweets and sugary beverages)  o Limit your portion sizes  o Do not add salt to your food when cooking or at the table  o Limit alcohol intake (no more than 1 drink each day for women or 2 drinks each day for men)    Weight Loss     o Work on losing weight with diet and exercise  o You BMI (body mass index) should be between 18.5-24.9  o This is a calculation of your weight and height  o Please ask your healthcare provider for your BMI    Manage Other Chronic Health Conditions     o Control cholesterol  o Eat a diet low in saturated fat  o Exercise   o Take a statin medication as  prescribed  o Manage blood pressure  o Eat a diet low in sodium  o Exercise  o Reduce stress  o Lose weight   o Take blood pressure medications as prescribed  o Control blood sugars if diabetic  o Monitor sugars and carbohydrates in your diet  o Lose weight   o Take diabetes medications as prescribed  o Follow-up with your primary care provider to make sure your blood sugars are well controlled    Stress Reduction     o Find time each day to relax  o Reading, listening to music, yoga, meditation, exercise, spending time with friends and family, volunteering   o Get 6-8 hours of sleep each night    Smoking Cessation     o Smoking causes numerous health problems including coronary artery disease  o It is never too late to quit  o Set realistic goals for quitting  o Decrease the number of cigarettes used each week  o Use nicotine gum or patches to help you quit    Information from the American [employer] Association.  Please visit their website at www.heart.org

## 2021-06-10 NOTE — TELEPHONE ENCOUNTER
Wellness Screening Tool  Symptom Screening:  Do you have one of the following NEW symptoms:    Fever (subjective or >100.0)?  No    A new cough?  No    Shortness of breath?  No     Chills? No     New loss of taste or smell? No     Generalized body aches? No     New persistent headache? No     New sore throat? No     Nausea, vomiting, or diarrhea?  No    Within the past 3 weeks, have you been exposed to someone with a known positive illness below:    COVID-19 (known or suspected)?  No    Chicken pox?  No    Mealses?  No    Pertussis?  No    Patient notified of visitor policy- They may have one person accompany them to their appointment, but they will need to wear a mask and will be screened upon arrival for symptoms: Yes  Pt informed to wear a mask: Yes  Pt notified if they develop any symptoms listed above, prior to their appointment, they are to call the clinic directly at [phone] for further instructions.  Yes  Patient's appointment status: Patient will be seen in clinic as scheduled on 8/17

## 2021-06-10 NOTE — PROGRESS NOTES
Fredrick Lockhart has participated in 2 sessions of Phase II Cardiac Rehab.    Progress Report:   Cardiac Rehab Treatment Progress Report 8/6/2020 8/6/2020 8/7/2020 8/11/2020 8/14/2020   Weight 222 lbs 11 oz 210 lbs 5 oz 209 lbs 5 oz 210 lbs 211 lbs   Pre Exercise  HR - - - 62 57   Pre Exercise BP - - - 129/78 146/76   Treadmill Peak HR - - - - 72   Nustep Peak Heart Rate - - - 68 80   Heart Rate - - - 57 62   Post Exercise BP - - - 128/77 118/62   ECG - - - SR, PVCs, NSVT SR;  No ectopy noted today   Total Exercise Minutes - - - 16 35         Current Status:  Currently exercising without complaints or symptoms.    If Physician recommends change in treatment plan, please place orders.        __________________________________________________      _____________  Signature                                                                                                  DateSee Doc Flowsheet

## 2021-06-10 NOTE — PROGRESS NOTES
Cardiac Rehab  Phase II Assessment    Assessment Date: 8/11/20    Diagnosis: STEMI  Date of Onset: 8/5/20  Procedure: PCI with NEREYDA x1 to RCA  Date of Onset: 8/5/20  ICD/Pacemaker: No Parameters: NA  Post-op Complications: NA  ECG History: SR, A fib, NSVT EF%:61  Past Medical History: Sleep apnea, tobacco abuse    Physical Assessment  Precautions/ Physical Limitations: S/P MI  Oxygen: No  O2 Sats: 97 Lung Sounds: Clear Edema: 0  Incisions: NA  Sleeping Pattern: good   Appetite: good   Nutrition Risk Screen: Weight loss    Pain  Intensity: (0-10 scale) 0    Psychosocial/ Emotional Health  1. In the past 12 months, have you been in a relationship where you have been abused physically, emotionally, sexually or financially? No  notified: NA  2. Who do you turn to for emotional support?: Friends, ex wife  3. Do you have cultural or spiritual needs? No  4. Have there been any major life changes in the past 12 months? No    Referral Information  Primary Physician: Vipul López MD  Cardiologist: TBMARCELLE      Home exercise/Equipment: None    Patient's long-term goal(s): See ITP    1. Living Accommodations: Home Steps: Yes      Support people at home: No   2. Marital Status:   3. Family is able to assist with cares      Scientologist/Community involvement: Attends [employer] meetings  4. Recreation/Hobbies: Working on cars, drag racing, fishing

## 2021-06-11 NOTE — TELEPHONE ENCOUNTER
Wellness Screening Tool  Symptom Screening:  Do you have one of the following NEW symptoms:    Fever (subjective or >100.0)?  No    A new cough?  No    Shortness of breath?  No     Chills? No     New loss of taste or smell? No     Generalized body aches? No     New persistent headache? No     New sore throat? No     Nausea, vomiting, or diarrhea?  No    Within the past 2 weeks, have you been exposed to someone with a known positive illness below:    COVID-19 (known or suspected)?  No    Chicken pox?  No    Mealses?  No    Pertussis?  No    Patient notified of visitor policy- They may have one person accompany them to their appointment, but they will need to wear a mask and will be screened upon arrival for symptoms: Yes  Pt informed to wear a mask: Yes  Pt notified if they develop any symptoms listed above, prior to their appointment, they are to call the clinic directly at [phone] for further instructions.  Yes  Patient's appointment status: Patient will be seen in clinic as scheduled on 9/28/20

## 2021-06-11 NOTE — PROGRESS NOTES
ITP ASSESSMENT   Assessment Day: 30 Day    Session Number: 12  Precautions: S/P MI    Diagnosis: MI;Stent    Risk Stratification: High    Referring Provider: Vipul López MD  EXERCISE  Exercise Assessment: Reassessment    Session 12 today, he is progressing nicely. He has established a routine for home exercise and has met with the dietician to help with HTN, HLD and weight. He has not smoked since his heart event. RTW last week is going well with some stress, he attends [employer] as a support system.                                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 MET level goal of 6-7      Education Goals: All goals in this section met    Education Goals Met: Patient can state cardiac s/s and appropriate emergency response.;Has system for taking medication.;Medication review.                          Goals Met  Initial ADL's goals met: Met. Pt is exercising at 3.5-3.8 met level for 40 minutes    Intial Work goals met: Pt has returned to work    Initial Progression: doing well with risk factor modification and home exercise.       Exercise Prescription  Exercise Mode: Treadmill;Bike;Nustep;Arm Erg.;Hallway Walking    Frequency: 2x/week    Duration: 40 min    Intensity / THR: 20-30 beats above resting heart rate (102-136)    RPE 11-14  Progression / Met level: 3.5-5    Resistive Training?: Yes      Current Exercise (mins/week): 220 (he tries to walk daily 20-30 minutes)      Interventions  Home Exercise:  Mode: walk    Frequency: 3-4x/week    Duration: 20-30 min      Education Material : Educational videos;Provide written material;Individual education and counseling;Offer educational classes      Education Completed  Exercise Education Completed: Cardiac Anatomy;Signs and Symptoms;Medication review;RPE;Emergency Plan;Home Exercise;Warm  "up/cool down;FITT Principles;BP/HR Reponse to exercise;Benefits of Exercise;End point of exercise              Exercise Follow-up/Discharge  Follow up/Discharge: Skilled therapy needed for pt S/P MI to monitor for CV changes with increases in exercise intensity, to educate and reinforce risk factor education. Pt will benefit from support from staff to remain tobacco free, quit with event           NUTRITION  Nutrition Assessment: Reassessment      Nutrition Risk Factors:  Nutrition Risk Factors: Dyslipidemia;Overweight  Cholesterol: 309 (8/6/2020)  LDL: 201  HDL: 31  Triglycerides: 238      Nutrition Plan  Interventions  Diet Consult: Completed    Other Nutrition Intervention: Therapist/Pt Discussion      Education Completed  Nutrition Education Completed: Low Saturated fat diet;Low sodium diet      Goals  Nutrition Goals (Next 30 days): Patient will follow a low sodium diet;Patient will follow a low saturated fat diet      Goals Met  Nutrition Goals Met: Patient knows appropriate portion size      Height, Weight, and  BMI  Weight: 212 lb (96.2 kg)  Height: 5' 10\" (1.778 m)  BMI: 30.42      Nutrition Follow-up  Follow-up/Discharge: RD available for questions as needed       Other Risk Factors  Other Risk Factor Assessment: Reassessment      HTN Risk Factor: Hypertension      Pre Exercise BP: 136/64  Post Exercise BP: 108/62      Hypertension Plan  Goals  HTN Goals: Follow low sodium diet;Exercises regularly      Goals Met  HTN Goals Met: Take medication as prescribed      HTN Interventions  HTN Interventions: Diet consult;Therapist/patient discussion;Provide written material;Offer educational videos;Offer educational classes      HTN Education Completed  HTN Education Completed: Low sodium diet;Medication review;Risk factor overview      Tobacco Risk Factor: Tobacco      Initial Use:: <1 ppd  Current Use:: none      Tobacco Plan  Tobacco Goals  Tobacco Goals: Patient remain tobacco free      Goals Met  Tobacco Goals " Met: Patient remain tobacco free      Tobacco Interventions  Tobacco Interventions: Therapist/patient discussion;Provide written material;Offer educational videos;Offer class on risk factor modification      Tobacco Education Completed  Tobacco Education Completed: Identifies triggers;Health benefits of tobacco cessation;Risk factor overview      Risk Factor Follow-up   Follow-up/Discharge: quit on 8/5/2020         PSYCHOSOCIAL  Psychosocial Assessment: Reassessment       [facility] Q of L Summary Score: 24      PHQ-9 Total Score: 4      Psychosocial Risk Factor: Stress      Psychosocial Plan  Interventions  Interventions: Offer Spiritual Care consult;Offer educational videos and classes;Provide written material;Individual education and counseling      Education Completed  Education Completed: Relaxation/Coping Techniques;Effects of stress on body      Goals  Goals (Next 30 days): Practicing stress management skills      Goals Met  Goals Met: Identified Support system;Oriented to stress management classes;Identify stressors      Psychosocial Follow-up  Follow-up/Discharge: Has some work stress and is managing. He belongs to [facility], good support through meetings and books           Patient involved in Goal setting?: Yes      Signature: _____________________________________________________________    Date: __________________    Time: __________________See Doc Flowsheet

## 2021-06-12 NOTE — PATIENT INSTRUCTIONS - HE
Heart Care Rehabilitation Home Exercise Program    Exercise Goal:    Modality Duration Intensity   Warm-up 5 Minutes Slow   Walk 30-40 Minutes OMNI 4-6; 3.6 mph/3%   Bike 30-40 Minutes OmNI 4-6; Level 6   Cool Down 5 Minutes Slow   Strength As Tolerated OMNI 4-6     Target Heart Rate: 102-136 BPM OR 20-30 Beats above Resting HR      OMNI EFFORT SCALE  0  1  2  3 Not tired at all    A little tired     4  5  6  7   Getting more tired    Tired     8  9  10 Really tired    Very, very tired         Special Comments/ Recommendations:  -Lipid Profile with Physician - Cholesterol levels  -Heart Healthy Diet - Low Sodium Diet/Low Fat Diet  -Continue Medications as Prescribed  -Continue Exercise as Tolerated    Stop Exercise!!! If any of the following occur:    Angina/chest pain    Dizziness    Excessive perspiration/cold sweats    Abnormal shortness of breath    Changes in heart rate (slow, fast, irregular)    Sudden fatigue or numbness    Nausea      Also...    Avoid extreme temperatures - exercise indoors if necessary    Wait at least 1 hour after a meal before strenuous activity    Do not exercise if you have a fever or are ill    Wear comfortable, supportive athletic clothing

## 2021-06-29 NOTE — PROGRESS NOTES
Progress Notes by Michelle Poe MD at 9/28/2020 10:30 AM     Author: Michelel Poe MD Service: -- Author Type: Physician    Filed: 10/1/2020  9:54 AM Encounter Date: 9/28/2020 Status: Signed    : Michelle Poe MD (Physician)           Thank you, Dr. López, for asking us to see Fredrick Lockhart at the [facility].      Assessment/Recommendations   Assessment:    1.  Coronary artery disease: Status post inferior myocardial infarction status post PCI of RCA on 8/5/2020  2.  Hyperlipidemia: Severe with an LDL of 238 started on Lipitor 80 mg daily  3.  Heavy smoking quit on 8/5/2020    Plan:  1.  Hypotension and lightheadedness.  We will stop losartan.  He has preserved left ventricular systolic function.  2.  Continue high-dose statin therapy.  He has severe hyperlipidemia and will need repeat lipids.  If lipids are not well controlled with LDL less than 70 will recommend changing to Crestor 40 mg daily.  3.  Continue dual antiplatelet therapy for 1 year following stent placement  4.  Continue cardiac rehab  Follow-up in 3 months     History of Present Illness    Mr. Fredrick Lockhart is a 50 y.o. male with history of heavy smoking, hyperlipidemia, hypertension who suffered an inferior ST elevation myocardial infarction status post PCI of RCA in August 2020 and is here today for follow-up.  He was also found to have nonsustained ventricular tachycardia and post MI atrial fibrillation that converted to normal sinus rhythm hours after his intervention.  He has not had any further known atrial fibrillation.  30-day DELVIS monitor on 9/29/2020 demonstrated no atrial fibrillation and only rare ectopic beats.  Normal sinus rhythm.  He has quit smoking since 8/5 when he had his heart attack.  He still feels mildly fatigued and is participating in cardiac rehab.  At times he also feels lightheaded and his blood pressure has been running low.     Physical Examination Review of  Systems   Vitals:    09/28/20 1036   BP: 98/54   Pulse: (!) 56   Resp: 10     Body mass index is 30.13 kg/m .  Wt Readings from Last 3 Encounters:   09/29/20 207 lb (93.9 kg)   09/28/20 210 lb (95.3 kg)   09/23/20 208 lb (94.3 kg)       General Appearance:   alert, no apparent distress   HEENT:  no scleral icterus; the mucous membranes are pink and moist                                  Neck:  No JVD   Chest: the spine is straight and the chest is symmetric   Lungs:   respirations unlabored; the lungs are clear to auscultation   Cardiovascular:   regular rhythm with normal first and second heart sounds and no murmurs or gallops   Abdomen:  no organomegaly, masses, bruits, or tenderness; bowel sounds are present   Extremities: no cyanosis, clubbing, or edema   Skin: no xanthelasma    General: WNL  Eyes: WNL  Ears/Nose/Throat: WNL  Lungs: Shortness of Breath  Heart: Irregular Heartbeat  Stomach: WNL  Bladder: WNL  Muscle/Joints: WNL  Skin: WNL  Nervous System: Daytime Sleepiness, Dizziness, Loss of Balance  Mental Health: WNL     Blood: WNL     Medical History  Surgical History Family History Social History   Coronary artery disease status post inferior STEMI in August 2020    NSVT    Hypertension    Post MI A. fib Past Surgical History:   Procedure Laterality Date   ? CV CORONARY ANGIOGRAM N/A 8/5/2020    Procedure: Coronary Angiogram;  Surgeon: Norma Betancourt MD;  Location: [facility] Lab;  Service: Cardiology   ? CV LEFT HEART CATHETERIZATION WO LEFT VETRICULOGRAM Left 8/5/2020    Procedure: Left Heart Catheterization Without Left Ventriculogram;  Surgeon: Norma Betancourt MD;  Location: [facility] Cath Lab;  Service: Cardiology    No family history of premature coronary artery disease Social History     Socioeconomic History   ? Marital status:      Spouse name: Not on file   ? Number of children: Not on file   ? Years of education: Not on file   ? Highest education level: Not on file    Occupational History   ? Not on file   Social Needs   ? Financial resource strain: Not on file   ? Food insecurity     Worry: Not on file     Inability: Not on file   ? Transportation needs     Medical: Not on file     Non-medical: Not on file   Tobacco Use   ? Smoking status: Former Smoker     Quit date: 2020     Years since quittin.1   ? Smokeless tobacco: Never Used   Substance and Sexual Activity   ? Alcohol use: Not Currently   ? Drug use: Never   ? Sexual activity: Not on file   Lifestyle   ? Physical activity     Days per week: Not on file     Minutes per session: Not on file   ? Stress: Not on file   Relationships   ? Social connections     Talks on phone: Not on file     Gets together: Not on file     Attends Spiritism service: Not on file     Active member of club or organization: Not on file     Attends meetings of clubs or organizations: Not on file     Relationship status: Not on file   ? Intimate partner violence     Fear of current or ex partner: Not on file     Emotionally abused: Not on file     Physically abused: Not on file     Forced sexual activity: Not on file   Other Topics Concern   ? Not on file   Social History Narrative   ? Not on file          Medications  Allergies   Current Outpatient Medications   Medication Sig Dispense Refill   ? aspirin 81 mg chewable tablet Chew 1 tablet (81 mg total) daily. Take aspirin indefinitely (for the rest of your life). 30 tablet 11   ? atorvastatin (LIPITOR) 80 MG tablet Take 1 tablet (80 mg total) by mouth at bedtime. 30 tablet 11   ? metoprolol tartrate (LOPRESSOR) 25 MG tablet Take 1 tablet (25 mg total) by mouth 2 (two) times a day. 180 tablet 3   ? ticagrelor (BRILINTA) 90 mg Tab Take 1 tablet (90 mg total) by mouth 2 (two) times a day. For 12 months 60 tablet 11     No current facility-administered medications for this visit.       Allergies   Allergen Reactions   ? Penicillins Hives         Lab Results    Chemistry/lipid CBC Cardiac  Enzymes/BNP/TSH/INR   Lab Results   Component Value Date    CHOL 309 (H) 08/06/2020    HDL 31 (L) 08/06/2020    LDLCALC 238 (H) 08/06/2020    TRIG 201 (H) 08/06/2020    CREATININE 0.92 08/17/2020    BUN 15 08/17/2020    K 4.6 08/17/2020     08/17/2020     08/17/2020    CO2 26 08/17/2020    Lab Results   Component Value Date    WBC 12.5 (H) 08/07/2020    HGB 14.8 08/07/2020    HCT 43.4 08/07/2020    MCV 90 08/07/2020     08/07/2020    Lab Results   Component Value Date    TROPONINI 24.96 (HH) 08/07/2020    INR 0.93 08/05/2020

## 2021-06-29 NOTE — PROGRESS NOTES
Progress Notes by Elisha Duong CNP at 8/17/2020  7:50 AM     Author: Elisha Duong CNP Service: -- Author Type: Nurse Practitioner    Filed: 8/17/2020  8:37 AM Encounter Date: 8/17/2020 Status: Signed    : Elisha Duong CNP (Nurse Practitioner)             Assessment/Recommendations   1.  Coronary artery disease: He was hospitalized August 5 to August 7, 2020 with STEMI.  PCI on August 5, 2020 with PTCA and drug-eluting stent to RCA.  Dual antiplatelet therapy is being used with aspirin indefinitely and ticagrelor for 1 year. Puncture site is well-healed with no pain or bruising.      Risk factor modification and lifestyle management topics were discussed including managing comorbidities, weight loss, heart healthy diet, exercise and smoking cessation.  He is participating in cardiac rehab.    BMP pending    2.  Dyslipidemia: Fredrick Lockhart is on high intensity statin therapy with atorvastatin 80 mg daily.  His LDL is 238.  He has a lipid panel scheduled in 2-3 months at primary care office.  We discussed a diet low in saturated fat, weight loss, and exercise along with medication for better control of cholesterol.     3.  Hypertension: His blood pressure is well controlled today taking losartan and metoprolol.    4.  Post MI A. fib: He was not started on anticoagulation.  He is currently wearing a 30-day monitor.  Heart rate is regular today.  No atrial fibrillation at cardiac rehab.    5.  Nonsustained VT: Continue metoprolol    6.  Possible obstructive sleep apnea: This was noted during hospitalization.  Fredrick does snore and has woken up abruptly with a sensation that he stopped breathing.  He is agreeable to sleep medicine referral.     Fredrick will follow up with Dr. Betancourt on September 18    [facility] and term disability paperwork received today.  This will be completed by Dr Betancourt's RN.  Fredrick does not feel ready to return to work at this time since his job requires continual heavy  lifting.        History of Present Illness/Subjective    Fredrick Lockhart is seen at [facility] for post coronary intervention follow up.  He was hospitalized August 5 to August 7, 2020 with STEMI.  PCI on August 5, 2020 with PTCA and drug-eluting stent to RCA.  Dual antiplatelet therapy is being used with aspirin indefinitely and ticagrelor for 1 year.  He had nonsustained VT post MI as well as a few hours of atrial fibrillation.  Echocardiogram on August 6, 2020 showed ejection fraction of 61%.      He denies any chest pain or shortness of breath since PCI.  He is walking daily and tolerating cardiac rehab with no symptoms.  He continues to feel fatigued and takes a nap most days.  This is improving.  He denies lightheadedness and lower extremity edema.      He is no longer smoking and he does not drink alcohol.  He is working on improving his diet.    Echo 8/6/2020:    Left ventricle ejection fraction is normal. The calculated left ventricular ejection fraction is 61%.    Normal right ventricular size and systolic function.    No hemodynamically significant valvular heart abnormalities.    No previous study for comparison.    Results for orders placed during the hospital encounter of 08/05/20   Cardiac Catheterization [CATH01] 08/05/2020    Narrative 48 yo M, presenting with chest pain, initial ECG unremarkable, but   subsequently developed an inferior current of injury. Presented to the ER   with marked HTN, CTA negative for dissection.  Urgent coronary angiography showed  LM mild dz  LAD moderate diffuse dz, no clear obstructive lesions  LCx mild to moderate dz  Ramus large vessel with 70% distal stenosis  RCA large dominant vessel with thrombotic occlusion distally and MING 0   flow beyond that point    Successful PCI of distal RCA with PTCA followed by 3x16 Synergy NEREYDA  0% residual, MING 3 flow post    LVedp 19mmHg        Physical Examination Review of Systems   Vitals:    08/17/20 0759   BP:  98/64   Pulse: 64   Resp: 16     Body mass index is 30.13 kg/m .  Wt Readings from Last 3 Encounters:   08/17/20 210 lb (95.3 kg)   08/14/20 211 lb (95.7 kg)   08/11/20 210 lb (95.3 kg)       General Appearance:     Alert, cooperative and in no acute distress.   ENT/Mouth: membranes moist, no oral lesions or bleeding gums.      EYES:  no scleral icterus, normal conjunctivae   Chest/Lungs:   lungs are clear to auscultation, no rales or wheezing, respirations unlabored   Cardiovascular:   Regular. Normal first and second heart sounds, no edema bilateral lower extremities    Abdomen:  Soft, nontender, nondistended, bowel sounds present   Extremities: no cyanosis or clubbing   Skin:  Neurologic: warm, dry.  mood and affect are appropriate, alert and oriented x3     Puncture Site: Right radial site is soft with no bruising.  Radial pulses and Pedal pulses intact and symmetrical.  CMS intact.        General: WNL  Eyes: WNL  Ears/Nose/Throat: WNL  Lungs: WNL  Heart: Chest Pain, Arm Pain, Shortness of Breath with activity, Irregular Heartbeat  Stomach: WNL  Bladder: WNL  Muscle/Joints: WNL  Skin: WNL  Nervous System: WNL  Mental Health: WNL     Blood: WNL     Medical History  Surgical History Family History Social History   No past medical history on file. Past Surgical History:   Procedure Laterality Date   ? CV CORONARY ANGIOGRAM N/A 8/5/2020    Procedure: Coronary Angiogram;  Surgeon: Norma Betancourt MD;  Location: [facility];  Service: Cardiology   ? CV LEFT HEART CATHETERIZATION WO LEFT VETRICULOGRAM Left 8/5/2020    Procedure: Left Heart Catheterization Without Left Ventriculogram;  Surgeon: Norma Betancourt MD;  Location: [facility];  Service: Cardiology    No family history on file. Social History     Socioeconomic History   ? Marital status:      Spouse name: Not on file   ? Number of children: Not on file   ? Years of education: Not on file   ? Highest education level: Not on file    Occupational History   ? Not on file   Social Needs   ? Financial resource strain: Not on file   ? Food insecurity     Worry: Not on file     Inability: Not on file   ? Transportation needs     Medical: Not on file     Non-medical: Not on file   Tobacco Use   ? Smoking status: Former Smoker     Last attempt to quit: 2020     Years since quittin.0   ? Smokeless tobacco: Never Used   Substance and Sexual Activity   ? Alcohol use: Not Currently   ? Drug use: Never   ? Sexual activity: Not on file   Lifestyle   ? Physical activity     Days per week: Not on file     Minutes per session: Not on file   ? Stress: Not on file   Relationships   ? Social connections     Talks on phone: Not on file     Gets together: Not on file     Attends Protestant service: Not on file     Active member of club or organization: Not on file     Attends meetings of clubs or organizations: Not on file     Relationship status: Not on file   ? Intimate partner violence     Fear of current or ex partner: Not on file     Emotionally abused: Not on file     Physically abused: Not on file     Forced sexual activity: Not on file   Other Topics Concern   ? Not on file   Social History Narrative   ? Not on file          Medications  Allergies   Current Outpatient Medications   Medication Sig Dispense Refill   ? aspirin 81 mg chewable tablet Chew 1 tablet (81 mg total) daily. Take aspirin indefinitely (for the rest of your life). 30 tablet 11   ? atorvastatin (LIPITOR) 80 MG tablet Take 1 tablet (80 mg total) by mouth at bedtime. 30 tablet 11   ? losartan (COZAAR) 25 MG tablet Take 1 tablet (25 mg total) by mouth daily. 90 tablet 3   ? metoprolol tartrate (LOPRESSOR) 25 MG tablet Take 1 tablet (25 mg total) by mouth 2 (two) times a day. 180 tablet 3   ? ticagrelor (BRILINTA) 90 mg Tab Take 1 tablet (90 mg total) by mouth 2 (two) times a day. For 12 months 60 tablet 11     No current facility-administered medications for this visit.     Allergies    Allergen Reactions   ? Penicillins Hives         Lab Results    Chemistry/lipid CBC Cardiac Enzymes/BNP/TSH/INR   Lab Results   Component Value Date    CHOL 309 (H) 08/06/2020    HDL 31 (L) 08/06/2020    LDLCALC 238 (H) 08/06/2020    TRIG 201 (H) 08/06/2020    CREATININE 0.84 08/07/2020    BUN 15 08/07/2020    K 4.2 08/07/2020     08/07/2020     08/07/2020    CO2 24 08/07/2020    Lab Results   Component Value Date    WBC 12.5 (H) 08/07/2020    HGB 14.8 08/07/2020    HCT 43.4 08/07/2020    MCV 90 08/07/2020     08/07/2020    Lab Results   Component Value Date    TROPONINI 24.96 (HH) 08/07/2020    INR 0.93 08/05/2020

## 2021-06-30 NOTE — PROGRESS NOTES
Progress Notes by Michelle Poe MD at 3/19/2021 11:30 AM     Author: Michelle Poe MD Service: -- Author Type: Physician    Filed: 3/19/2021 12:11 PM Encounter Date: 3/19/2021 Status: Signed    : Michelle Poe MD (Physician)           Thank you, Dr. López, for asking us to see Fredrick Lockhart at the [facility].      Assessment/Recommendations   Assessment:    1.  Coronary artery disease: Status post inferior myocardial infarction status post PCI of RCA on 8/5/2020  2.  Hyperlipidemia: Severe with an LDL of 238 improved to LDL of 90s on Lipitor 80 mg daily.  Issues with elevated LFTs and now on 40 mg daily.  3.  Heavy smoking quit on 8/5/2020     Plan:  1.   Check fasting lipids today.  Continue Lipitor 40 mg daily  2.    Continue smoking cessation   3.  Continue dual antiplatelet therapy for 1 year following stent placement we discussed changing Brilinta to Plavix given the slight increase shortness of breath that he has noticed since his intervention however he wishes to continue on Brilinta and finish the year therapy.  4.  Continue diet and exercise  Follow-up in 6 months       History of Present Illness    Mr. Fredrick Lockhart is a 50 y.o. male with history of heavy smoking, hyperlipidemia, hypertension who suffered an inferior ST elevation myocardial infarction status post PCI of RCA in August 2020 and is here today for follow-up.  He was also found to have nonsustained ventricular tachycardia and post MI atrial fibrillation that converted to normal sinus rhythm hours after his intervention.  He has not had any further known atrial fibrillation.  He had issues with Lipitor 80 mg daily and mildly elevated LFTs.  He was off Lipitor for a month and then restarted it at 40 mg daily.  His last cholesterol panel in November was done on high-dose statin therapy.  He has been trying to continue exercise.  He watches his diet and has managed to continue with smoking  cessation since August when he quit.  He feels that he has some slight shortness of breath at times it comes randomly which may be related to Brilinta.       Physical Examination Review of Systems   Vitals:    03/19/21 1118   BP: 126/78   Pulse: 64   Resp: 16     Body mass index is 33.03 kg/m .  Wt Readings from Last 3 Encounters:   03/19/21 (!) 230 lb 3.2 oz (104.4 kg)   10/08/20 210 lb (95.3 kg)   10/06/20 207 lb (93.9 kg)       General Appearance:   alert, no apparent distress   HEENT:  no scleral icterus; the mucous membranes are pink and moist                                  Neck: No jvd   Chest: the spine is straight and the chest is symmetric   Lungs:   respirations unlabored; the lungs are clear to auscultation   Cardiovascular:   regular rhythm with normal first and second heart sounds and no murmurs or gallops   Abdomen:  no organomegaly, masses, bruits, or tenderness; bowel sounds are present   Extremities: no cyanosis, clubbing, or edema   Skin: no xanthelasma    General: Weight Gain  Eyes: WNL  Ears/Nose/Throat: WNL  Lungs: Shortness of Breath  Heart: Shortness of Breath with activity  Stomach: WNL  Bladder: WNL  Muscle/Joints: Joint Pain, Muscle Weakness  Skin: WNL  Nervous System: WNL  Mental Health: WNL     Blood: WNL     Medical History  Surgical History Family History Social History   Coronary artery disease status post inferior STEMI in August 2020     NSVT     Hypertension     Post MI A. fib Past Surgical History:   Procedure Laterality Date   ? CV CORONARY ANGIOGRAM N/A 8/5/2020    Procedure: Coronary Angiogram;  Surgeon: Norma Betancourt MD;  Location: [facility];  Service: Cardiology   ? CV LEFT HEART CATHETERIZATION WO LEFT VETRICULOGRAM Left 8/5/2020    Procedure: Left Heart Catheterization Without Left Ventriculogram;  Surgeon: Norma Betancourt MD;  Location: [facility];  Service: Cardiology    No family history of premature coronary artery disease Social History      Socioeconomic History   ? Marital status:      Spouse name: Not on file   ? Number of children: Not on file   ? Years of education: Not on file   ? Highest education level: Not on file   Occupational History   ? Not on file   Social Needs   ? Financial resource strain: Not on file   ? Food insecurity     Worry: Not on file     Inability: Not on file   ? Transportation needs     Medical: Not on file     Non-medical: Not on file   Tobacco Use   ? Smoking status: Former Smoker     Packs/day: 1.00     Years: 20.00     Pack years: 20.00     Quit date: 2020     Years since quittin.6   ? Smokeless tobacco: Never Used   Substance and Sexual Activity   ? Alcohol use: Not Currently   ? Drug use: Never   ? Sexual activity: Not on file   Lifestyle   ? Physical activity     Days per week: Not on file     Minutes per session: Not on file   ? Stress: Not on file   Relationships   ? Social connections     Talks on phone: Not on file     Gets together: Not on file     Attends Jewish service: Not on file     Active member of club or organization: Not on file     Attends meetings of clubs or organizations: Not on file     Relationship status: Not on file   ? Intimate partner violence     Fear of current or ex partner: Not on file     Emotionally abused: Not on file     Physically abused: Not on file     Forced sexual activity: Not on file   Other Topics Concern   ? Not on file   Social History Narrative   ? Not on file          Medications  Allergies   Current Outpatient Medications   Medication Sig Dispense Refill   ? aspirin 81 mg chewable tablet Chew 1 tablet (81 mg total) daily. Take aspirin indefinitely (for the rest of your life). 30 tablet 11   ? atorvastatin (LIPITOR) 80 MG tablet Take 1 tablet (80 mg total) by mouth at bedtime. (Patient taking differently: Take 40 mg by mouth at bedtime. ) 30 tablet 11   ? metoprolol tartrate (LOPRESSOR) 25 MG tablet Take 1 tablet (25 mg total) by mouth 2 (two) times a  day. 180 tablet 3   ? ticagrelor (BRILINTA) 90 mg Tab Take 1 tablet (90 mg total) by mouth 2 (two) times a day. For 12 months 60 tablet 11     No current facility-administered medications for this visit.       Allergies   Allergen Reactions   ? Penicillins Hives         Lab Results    Chemistry/lipid CBC Cardiac Enzymes/BNP/TSH/INR   Lab Results   Component Value Date    CHOL 146 11/12/2020    HDL 33 (L) 11/12/2020    LDLCALC 97 11/12/2020    TRIG 82 11/12/2020    CREATININE 1.00 11/12/2020    BUN 15 11/12/2020    K 4.9 11/12/2020     11/12/2020     11/12/2020    CO2 27 11/12/2020    Lab Results   Component Value Date    WBC 12.5 (H) 08/07/2020    HGB 14.8 08/07/2020    HCT 43.4 08/07/2020    MCV 90 08/07/2020     08/07/2020    Lab Results   Component Value Date    TROPONINI 24.96 (HH) 08/07/2020    INR 0.93 08/05/2020

## 2021-07-24 ENCOUNTER — HEALTH MAINTENANCE LETTER (OUTPATIENT)
Age: 51
End: 2021-07-24

## 2021-07-24 ENCOUNTER — MYC MEDICAL ADVICE (OUTPATIENT)
Dept: CARDIOLOGY | Facility: CLINIC | Age: 51
End: 2021-07-24

## 2021-07-26 NOTE — TELEPHONE ENCOUNTER
Return MyChart msg sent to patient with clinic scheduling # to call and arrange f/u - msg also sent to scheduling pool.      Msg rec'd 7-23-21 @ 7625:  He is requesting a follow-up, can you help with this.  Michelle Poe MD

## 2021-09-07 DIAGNOSIS — I25.10 CORONARY ARTERY DISEASE INVOLVING NATIVE CORONARY ARTERY OF NATIVE HEART, ANGINA PRESENCE UNSPECIFIED: ICD-10-CM

## 2021-09-07 DIAGNOSIS — I10 PRIMARY HYPERTENSION: ICD-10-CM

## 2021-09-07 RX ORDER — METOPROLOL TARTRATE 25 MG/1
25 TABLET, FILM COATED ORAL 2 TIMES DAILY
Qty: 180 TABLET | Refills: 1 | Status: SHIPPED | OUTPATIENT
Start: 2021-09-07 | End: 2022-03-09

## 2021-09-15 ENCOUNTER — OFFICE VISIT (OUTPATIENT)
Dept: CARDIOLOGY | Facility: CLINIC | Age: 51
End: 2021-09-15
Payer: COMMERCIAL

## 2021-09-15 VITALS
HEIGHT: 70 IN | DIASTOLIC BLOOD PRESSURE: 74 MMHG | RESPIRATION RATE: 12 BRPM | WEIGHT: 244 LBS | HEART RATE: 69 BPM | SYSTOLIC BLOOD PRESSURE: 124 MMHG | BODY MASS INDEX: 34.93 KG/M2

## 2021-09-15 DIAGNOSIS — I25.10 CORONARY ARTERY DISEASE INVOLVING NATIVE CORONARY ARTERY OF NATIVE HEART, ANGINA PRESENCE UNSPECIFIED: ICD-10-CM

## 2021-09-15 DIAGNOSIS — I21.11 ST ELEVATION MYOCARDIAL INFARCTION INVOLVING RIGHT CORONARY ARTERY (H): ICD-10-CM

## 2021-09-15 DIAGNOSIS — E78.5 DYSLIPIDEMIA: ICD-10-CM

## 2021-09-15 PROCEDURE — 99214 OFFICE O/P EST MOD 30 MIN: CPT | Performed by: INTERNAL MEDICINE

## 2021-09-15 RX ORDER — ROSUVASTATIN CALCIUM 40 MG/1
40 TABLET, COATED ORAL AT BEDTIME
Qty: 90 TABLET | Refills: 3 | Status: SHIPPED | OUTPATIENT
Start: 2021-09-15 | End: 2022-09-29

## 2021-09-15 ASSESSMENT — MIFFLIN-ST. JEOR: SCORE: 1958.03

## 2021-09-15 NOTE — PATIENT INSTRUCTIONS
Fredrick IRBY Richy,     It was a pleasure to see you in the office today. My recommendations for you include:   1. Increase crestor to 40 mg daily  2. Check lipids and liver test in two months     Please do not hesitate to call the [facility] with any questions or concerns at [phone].    Sincerely,     Michelle Poe MD

## 2021-09-15 NOTE — LETTER
"9/15/2021    Vipul López MD  [facility] [address]  [facility] [address]    RE: Fredrick Lockhart       Dear Colleague,    I had the pleasure of seeing Fredrick Lockhart in the [facility].      HEART CARE ENCOUNTER CONSULTATON NOTE      [facility]  [phone]      Assessment/Recommendations   Assessment:    1.  Coronary artery disease: Status post inferior myocardial infarction status post PCI of RCA on 8/5/2020  2.  Hyperlipidemia: Severe with an LDL of 238 improved to LDL of 90s on Lipitor 80 mg daily.  Issues with elevated LFTs initially.  Now tolerating statin therapy.  Lipids still not adequately controlled and will increase Crestor dose.  3.  Heavy smoking quit on 8/5/2020     Plan:  1.  Increase Crestor to 40 mg daily, recheck lipids and AST/ALT in a couple months  2.  Continue smoking cessation   3.  Continue aspirin, metoprolol  4.  Continue diet and exercise  Follow-up in 1 year     History of Present Illness/Subjective    HPI: Fredrick Lockhart is a 51 year old male h history of heavy smoking, hyperlipidemia, hypertension who suffered an inferior ST elevation myocardial infarction status post PCI of RCA in August 2020 and is here today for follow-up.  He was also found to have nonsustained ventricular tachycardia and post MI atrial fibrillation that converted to normal sinus rhythm hours after his intervention.  He has not had any further known atrial fibrillation.  He has been doing well since I last saw him.  Feeling good without breathing difficulty or chest pain.  After his Brilinta stopped he did notice improvement in his shortness of breath.       Physical Examination  Review of Systems   Vitals: /74 (BP Location: Left arm, Patient Position: Sitting, Cuff Size: Adult Large)   Pulse 69   Resp 12   Ht 1.778 m (5' 10\")   Wt 110.7 kg (244 lb)   BMI 35.01 kg/m    BMI= Body mass index is 35.01 kg/m .  Wt " Readings from Last 3 Encounters:   09/15/21 110.7 kg (244 lb)   21 104.4 kg (230 lb 3.2 oz)   10/08/20 95.3 kg (210 lb)       General Appearance:   no distress, normal body habitus   ENT/Mouth: membranes moist, no oral lesions or bleeding gums.      EYES:  no scleral icterus, normal conjunctivae   Neck: no carotid bruits or thyromegaly   Chest/Lungs:   lungs are clear to auscultation    Cardiovascular:   regular. Normal first and second heart sounds with no murmurs   no edema bilaterally    Abdomen:  no organomegaly, masses, bruits, or tenderness; bowel sounds are present   Extremities: no cyanosis or clubbing   Skin: no xanthelasma, warm.    Neurologic: normal  bilateral, no tremors     Psychiatric: alert and oriented x3, calm        Please refer above for cardiac ROS details.        Medical History  Surgical History Family History Social History   Coronary artery disease status post inferior STEMI in 2020     NSVT     Hypertension     Post MI A. fib Past Surgical History:   Procedure Laterality Date     CV CORONARY ANGIOGRAM N/A 2020    Procedure: Coronary Angiogram;  Surgeon: Norma Betancourt MD;  Location: [facility] Lab;  Service: Cardiology     CV LEFT HEART CATHETERIZATION WITHOUT LEFT VENTRICULOGRAM Left 2020    Procedure: Left Heart Catheterization Without Left Ventriculogram;  Surgeon: Norma Betancourt MD;  Location: [facility];  Service: Cardiology       No family history of premature coronary artery disease Social History     Socioeconomic History     Marital status:      Spouse name: Not on file     Number of children: Not on file     Years of education: Not on file     Highest education level: Not on file   Occupational History     Not on file   Tobacco Use     Smoking status: Former Smoker     Packs/day: 1.00     Years: 20.00     Pack years: 20.00     Quit date: 2020     Years since quittin.1     Smokeless tobacco: Never Used   Substance and  Sexual Activity     Alcohol use: Not Currently     Drug use: Never     Sexual activity: Not on file   Other Topics Concern     Not on file   Social History Narrative     Not on file     Social Determinants of Health     Financial Resource Strain:      Difficulty of Paying Living Expenses:    Food Insecurity:      Worried About Running Out of Food in the Last Year:      Ran Out of Food in the Last Year:    Transportation Needs:      Lack of Transportation (Medical):      Lack of Transportation (Non-Medical):    Physical Activity:      Days of Exercise per Week:      Minutes of Exercise per Session:    Stress:      Feeling of Stress :    Social Connections:      Frequency of Communication with Friends and Family:      Frequency of Social Gatherings with Friends and Family:      Attends Mormonism Services:      Active Member of Clubs or Organizations:      Attends Club or Organization Meetings:      Marital Status:    Intimate Partner Violence:      Fear of Current or Ex-Partner:      Emotionally Abused:      Physically Abused:      Sexually Abused:            Medications  Allergies   Current Outpatient Medications   Medication Sig Dispense Refill     aspirin 81 mg chewable tablet [ASPIRIN 81 MG CHEWABLE TABLET] Chew 1 tablet (81 mg total) daily. Take aspirin indefinitely (for the rest of your life). 30 tablet 11     metoprolol tartrate (LOPRESSOR) 25 MG tablet Take 1 tablet (25 mg) by mouth 2 times daily 180 tablet 1     rosuvastatin (CRESTOR) 40 MG tablet Take 1 tablet (40 mg) by mouth At Bedtime 90 tablet 3       Allergies   Allergen Reactions     Penicillins Hives          Lab Results    Chemistry/lipid CBC Cardiac Enzymes/BNP/TSH/INR   Recent Labs   Lab Test 05/05/21  1024   CHOL 149   HDL 38*   LDL 90   TRIG 107     Recent Labs   Lab Test 05/05/21  1024 03/19/21  1156 11/12/20  0846   LDL 90 114 97     Recent Labs   Lab Test 05/05/21  1024      POTASSIUM 4.7   CHLORIDE 105   CO2 29      BUN 16   CR  0.96   GFRESTIMATED >60   ANITA 9.4     Recent Labs   Lab Test 05/05/21  1024 11/12/20  0846 08/17/20  0826   CR 0.96 1.00 0.92     No results for input(s): A1C in the last 94329 hours.       Recent Labs   Lab Test 08/07/20  0706   WBC 12.5*   HGB 14.8   HCT 43.4   MCV 90        Recent Labs   Lab Test 08/07/20  0706 08/06/20  0257 08/05/20  1240   HGB 14.8 14.9 16.0    Recent Labs   Lab Test 08/07/20  0706 08/06/20  0257 08/05/20  1537   TROPONINI 24.96* 59.22* 0.10     No results for input(s): BNP, NTBNPI, NTBNP in the last 63645 hours.  No results for input(s): TSH in the last 09672 hours.  Recent Labs   Lab Test 08/05/20  1240   INR 0.93        Michelle Poe MD                                          Thank you for allowing me to participate in the care of your patient.      Sincerely,     Michelle Poe MD     [facility]  cc:   No referring provider defined for this encounter.

## 2021-09-17 NOTE — PROGRESS NOTES
"  HEART CARE ENCOUNTER CONSULTATON NOTE      [facility]  [phone]      Assessment/Recommendations   Assessment:    1.  Coronary artery disease: Status post inferior myocardial infarction status post PCI of RCA on 8/5/2020  2.  Hyperlipidemia: Severe with an LDL of 238 improved to LDL of 90s on Lipitor 80 mg daily.  Issues with elevated LFTs initially.  Now tolerating statin therapy.  Lipids still not adequately controlled and will increase Crestor dose.  3.  Heavy smoking quit on 8/5/2020     Plan:  1.  Increase Crestor to 40 mg daily, recheck lipids and AST/ALT in a couple months  2.  Continue smoking cessation   3.  Continue aspirin, metoprolol  4.  Continue diet and exercise  Follow-up in 1 year     History of Present Illness/Subjective    HPI: Fredrick Lockhart is a 51 year old male h history of heavy smoking, hyperlipidemia, hypertension who suffered an inferior ST elevation myocardial infarction status post PCI of RCA in August 2020 and is here today for follow-up.  He was also found to have nonsustained ventricular tachycardia and post MI atrial fibrillation that converted to normal sinus rhythm hours after his intervention.  He has not had any further known atrial fibrillation.  He has been doing well since I last saw him.  Feeling good without breathing difficulty or chest pain.  After his Brilinta stopped he did notice improvement in his shortness of breath.       Physical Examination  Review of Systems   Vitals: /74 (BP Location: Left arm, Patient Position: Sitting, Cuff Size: Adult Large)   Pulse 69   Resp 12   Ht 1.778 m (5' 10\")   Wt 110.7 kg (244 lb)   BMI 35.01 kg/m    BMI= Body mass index is 35.01 kg/m .  Wt Readings from Last 3 Encounters:   09/15/21 110.7 kg (244 lb)   03/19/21 104.4 kg (230 lb 3.2 oz)   10/08/20 95.3 kg (210 lb)       General Appearance:   no distress, normal body habitus   ENT/Mouth: membranes moist, no oral lesions or bleeding gums.      EYES:  no " scleral icterus, normal conjunctivae   Neck: no carotid bruits or thyromegaly   Chest/Lungs:   lungs are clear to auscultation    Cardiovascular:   regular. Normal first and second heart sounds with no murmurs   no edema bilaterally    Abdomen:  no organomegaly, masses, bruits, or tenderness; bowel sounds are present   Extremities: no cyanosis or clubbing   Skin: no xanthelasma, warm.    Neurologic: normal  bilateral, no tremors     Psychiatric: alert and oriented x3, calm        Please refer above for cardiac ROS details.        Medical History  Surgical History Family History Social History   Coronary artery disease status post inferior STEMI in 2020     NSVT     Hypertension     Post MI A. fib Past Surgical History:   Procedure Laterality Date     CV CORONARY ANGIOGRAM N/A 2020    Procedure: Coronary Angiogram;  Surgeon: Norma Betancourt MD;  Location: [facility];  Service: Cardiology     CV LEFT HEART CATHETERIZATION WITHOUT LEFT VENTRICULOGRAM Left 2020    Procedure: Left Heart Catheterization Without Left Ventriculogram;  Surgeon: Norma Betancourt MD;  Location: [facility];  Service: Cardiology       No family history of premature coronary artery disease Social History     Socioeconomic History     Marital status:      Spouse name: Not on file     Number of children: Not on file     Years of education: Not on file     Highest education level: Not on file   Occupational History     Not on file   Tobacco Use     Smoking status: Former Smoker     Packs/day: 1.00     Years: 20.00     Pack years: 20.00     Quit date: 2020     Years since quittin.1     Smokeless tobacco: Never Used   Substance and Sexual Activity     Alcohol use: Not Currently     Drug use: Never     Sexual activity: Not on file   Other Topics Concern     Not on file   Social History Narrative     Not on file     Social Determinants of Health     Financial Resource Strain:      Difficulty of  Paying Living Expenses:    Food Insecurity:      Worried About Running Out of Food in the Last Year:      Ran Out of Food in the Last Year:    Transportation Needs:      Lack of Transportation (Medical):      Lack of Transportation (Non-Medical):    Physical Activity:      Days of Exercise per Week:      Minutes of Exercise per Session:    Stress:      Feeling of Stress :    Social Connections:      Frequency of Communication with Friends and Family:      Frequency of Social Gatherings with Friends and Family:      Attends Shinto Services:      Active Member of Clubs or Organizations:      Attends Club or Organization Meetings:      Marital Status:    Intimate Partner Violence:      Fear of Current or Ex-Partner:      Emotionally Abused:      Physically Abused:      Sexually Abused:            Medications  Allergies   Current Outpatient Medications   Medication Sig Dispense Refill     aspirin 81 mg chewable tablet [ASPIRIN 81 MG CHEWABLE TABLET] Chew 1 tablet (81 mg total) daily. Take aspirin indefinitely (for the rest of your life). 30 tablet 11     metoprolol tartrate (LOPRESSOR) 25 MG tablet Take 1 tablet (25 mg) by mouth 2 times daily 180 tablet 1     rosuvastatin (CRESTOR) 40 MG tablet Take 1 tablet (40 mg) by mouth At Bedtime 90 tablet 3       Allergies   Allergen Reactions     Penicillins Hives          Lab Results    Chemistry/lipid CBC Cardiac Enzymes/BNP/TSH/INR   Recent Labs   Lab Test 05/05/21  1024   CHOL 149   HDL 38*   LDL 90   TRIG 107     Recent Labs   Lab Test 05/05/21  1024 03/19/21  1156 11/12/20  0846   LDL 90 114 97     Recent Labs   Lab Test 05/05/21  1024      POTASSIUM 4.7   CHLORIDE 105   CO2 29      BUN 16   CR 0.96   GFRESTIMATED >60   ANITA 9.4     Recent Labs   Lab Test 05/05/21  1024 11/12/20  0846 08/17/20  0826   CR 0.96 1.00 0.92     No results for input(s): A1C in the last 54822 hours.       Recent Labs   Lab Test 08/07/20  0706   WBC 12.5*   HGB 14.8   HCT 43.4   MCV  90        Recent Labs   Lab Test 08/07/20  0706 08/06/20  0257 08/05/20  1240   HGB 14.8 14.9 16.0    Recent Labs   Lab Test 08/07/20  0706 08/06/20  0257 08/05/20  1537   TROPONINI 24.96* 59.22* 0.10     No results for input(s): BNP, NTBNPI, NTBNP in the last 42269 hours.  No results for input(s): TSH in the last 06226 hours.  Recent Labs   Lab Test 08/05/20  1240   INR 0.93        Michelle Poe MD

## 2021-09-18 ENCOUNTER — HEALTH MAINTENANCE LETTER (OUTPATIENT)
Age: 51
End: 2021-09-18

## 2021-11-15 ENCOUNTER — LAB (OUTPATIENT)
Dept: CARDIOLOGY | Facility: CLINIC | Age: 51
End: 2021-11-15
Payer: COMMERCIAL

## 2021-11-15 DIAGNOSIS — E78.5 DYSLIPIDEMIA: ICD-10-CM

## 2021-11-15 LAB
ALT SERPL W P-5'-P-CCNC: 45 U/L (ref 0–45)
AST SERPL W P-5'-P-CCNC: 29 U/L (ref 0–40)
CHOLEST SERPL-MCNC: 160 MG/DL
FASTING STATUS PATIENT QL REPORTED: YES
HDLC SERPL-MCNC: 47 MG/DL
LDLC SERPL CALC-MCNC: 91 MG/DL
TRIGL SERPL-MCNC: 109 MG/DL

## 2021-11-15 PROCEDURE — 80061 LIPID PANEL: CPT

## 2021-11-15 PROCEDURE — 84460 ALANINE AMINO (ALT) (SGPT): CPT

## 2021-11-15 PROCEDURE — 36415 COLL VENOUS BLD VENIPUNCTURE: CPT

## 2021-11-15 PROCEDURE — 84450 TRANSFERASE (AST) (SGOT): CPT

## 2021-11-17 DIAGNOSIS — I10 ESSENTIAL HYPERTENSION: ICD-10-CM

## 2021-11-17 DIAGNOSIS — I21.11 ST ELEVATION MYOCARDIAL INFARCTION INVOLVING RIGHT CORONARY ARTERY (H): Primary | ICD-10-CM

## 2021-11-17 DIAGNOSIS — I48.91 ATRIAL FIBRILLATION (H): ICD-10-CM

## 2021-11-17 DIAGNOSIS — E78.5 DYSLIPIDEMIA: ICD-10-CM

## 2021-11-17 DIAGNOSIS — I25.10 CORONARY ARTERY DISEASE INVOLVING NATIVE CORONARY ARTERY OF NATIVE HEART WITHOUT ANGINA PECTORIS: ICD-10-CM

## 2022-01-10 ENCOUNTER — TRANSFERRED RECORDS (OUTPATIENT)
Dept: HEALTH INFORMATION MANAGEMENT | Facility: CLINIC | Age: 52
End: 2022-01-10

## 2022-03-08 DIAGNOSIS — I25.10 CORONARY ARTERY DISEASE INVOLVING NATIVE CORONARY ARTERY OF NATIVE HEART: ICD-10-CM

## 2022-03-08 DIAGNOSIS — I10 PRIMARY HYPERTENSION: ICD-10-CM

## 2022-03-09 RX ORDER — METOPROLOL TARTRATE 25 MG/1
TABLET, FILM COATED ORAL
Qty: 180 TABLET | Refills: 1 | Status: SHIPPED | OUTPATIENT
Start: 2022-03-09 | End: 2022-09-29

## 2022-08-14 ENCOUNTER — HEALTH MAINTENANCE LETTER (OUTPATIENT)
Age: 52
End: 2022-08-14

## 2022-11-03 ENCOUNTER — OFFICE VISIT (OUTPATIENT)
Dept: CARDIOLOGY | Facility: CLINIC | Age: 52
End: 2022-11-03
Payer: COMMERCIAL

## 2022-11-03 VITALS
WEIGHT: 241.2 LBS | BODY MASS INDEX: 34.53 KG/M2 | DIASTOLIC BLOOD PRESSURE: 82 MMHG | HEIGHT: 70 IN | SYSTOLIC BLOOD PRESSURE: 136 MMHG | RESPIRATION RATE: 16 BRPM | HEART RATE: 60 BPM

## 2022-11-03 DIAGNOSIS — I25.10 CORONARY ARTERY DISEASE INVOLVING NATIVE CORONARY ARTERY OF NATIVE HEART WITHOUT ANGINA PECTORIS: ICD-10-CM

## 2022-11-03 DIAGNOSIS — E78.5 DYSLIPIDEMIA: ICD-10-CM

## 2022-11-03 DIAGNOSIS — I10 ESSENTIAL HYPERTENSION: ICD-10-CM

## 2022-11-03 DIAGNOSIS — G47.33 OSA (OBSTRUCTIVE SLEEP APNEA): Primary | ICD-10-CM

## 2022-11-03 DIAGNOSIS — I21.11 ST ELEVATION MYOCARDIAL INFARCTION INVOLVING RIGHT CORONARY ARTERY (H): ICD-10-CM

## 2022-11-03 LAB — LDLC SERPL DIRECT ASSAY-MCNC: 84 MG/DL

## 2022-11-03 PROCEDURE — 99214 OFFICE O/P EST MOD 30 MIN: CPT | Performed by: INTERNAL MEDICINE

## 2022-11-03 PROCEDURE — 36415 COLL VENOUS BLD VENIPUNCTURE: CPT | Performed by: INTERNAL MEDICINE

## 2022-11-03 PROCEDURE — 83721 ASSAY OF BLOOD LIPOPROTEIN: CPT | Performed by: INTERNAL MEDICINE

## 2022-11-03 RX ORDER — METOPROLOL SUCCINATE 50 MG/1
50 TABLET, EXTENDED RELEASE ORAL DAILY
Qty: 90 TABLET | Refills: 3 | Status: SHIPPED | OUTPATIENT
Start: 2022-11-03 | End: 2022-11-21

## 2022-11-03 NOTE — LETTER
11/3/2022    Vipul López MD  [facility] [address]  [facility] [address]    RE: Fredrick Lockhart       Dear Colleague,     I had the pleasure of seeing Fredrick Lockhart in the [facility].    HEART CARE ENCOUNTER CONSULTATON NOTE      [facility]  [phone]      Assessment/Recommendations   Assessment:    1.  Coronary artery disease: Status post inferior myocardial infarction status post PCI of RCA on 8/5/2020  2.  Hyperlipidemia: Severe with an LDL of 238 improved to LDL of 90s on Crestor 40 mg daily.  We will repeat direct LDL today.  Goal LDL is less than 70.  3.  Heavy smoking quit on 8/5/2020  4.  Recurrent palpitations occurring almost daily towards the end of the day and will proceed with a 14-day event monitor for further evaluation.  5.  Possible EBONY with daytime fatigue     Plan:  1.  Continue Crestor 40 mg daily.  Will check direct LDL today.  Consider adding Zetia if LDL still not within goals  2.  Continue smoking cessation   3.  Continue aspirin.  We will change metoprolol dosing to Toprol-XL 50 mg daily from metoprolol tartrate 25 mg twice daily.  4.  Continue diet and exercise  5.  Sleep study evaluation  Follow-up in 1 year       History of Present Illness/Subjective    HPI: Fredrick Lockhart is a 52 year old male  history of heavy smoking, hyperlipidemia, hypertension who suffered an inferior ST elevation myocardial infarction status post PCI of RCA in August 2020 and is here today for follow-up.  He was also found to have nonsustained ventricular tachycardia and post MI atrial fibrillation that converted to normal sinus rhythm hours after his intervention.  He has not had any further known atrial fibrillation.      Patient today reports that towards the end of his 12-hour shift he is noticing an irregular heartbeat and skipped heartbeats.  He works in [place] and lives in [place].  He does not have much time to exercise and admits to lack of  "exercise.  Contributing to this he had right carpal tunnel surgery and is going to be undergoing left carpal tunnel surgery shortly.  Denies any problems of shortness of breath or chest pain.  He reports compliance with medications.       Physical Examination  Review of Systems   Vitals: /82 (BP Location: Right arm, Patient Position: Sitting, Cuff Size: Adult Large)   Pulse 60   Resp 16   Ht 1.778 m (5' 10\")   Wt 109.4 kg (241 lb 3.2 oz)   BMI 34.61 kg/m    BMI= Body mass index is 34.61 kg/m .  Wt Readings from Last 3 Encounters:   11/03/22 109.4 kg (241 lb 3.2 oz)   09/15/21 110.7 kg (244 lb)   03/19/21 104.4 kg (230 lb 3.2 oz)       General Appearance:   no distress, normal body habitus   ENT/Mouth: membranes moist, no oral lesions or bleeding gums.      EYES:  no scleral icterus, normal conjunctivae   Neck: no carotid bruits or thyromegaly   Chest/Lungs:   lungs are clear to auscultation   Cardiovascular:   Regular. Normal first and second heart sounds with no murmurs  no edema bilaterally    Abdomen:  no organomegaly, masses, bruits, or tenderness; bowel sounds are present   Extremities: no cyanosis or clubbing   Skin: no xanthelasma, warm.    Neurologic: normal  bilateral, no tremors     Psychiatric: alert and oriented x3, calm        Please refer above for cardiac ROS details.        Medical History  Surgical History Family History Social History   Coronary artery disease  Dyslipidemia Past Surgical History:   Procedure Laterality Date     CV CORONARY ANGIOGRAM N/A 8/5/2020    Procedure: Coronary Angiogram;  Surgeon: Norma Betancourt MD;  Location: [facility];  Service: Cardiology     CV LEFT HEART CATHETERIZATION WITHOUT LEFT VENTRICULOGRAM Left 8/5/2020    Procedure: Left Heart Catheterization Without Left Ventriculogram;  Surgeon: Norma Betancourt MD;  Location: [facility];  Service: Cardiology     No significant family history of heart disease   Social History "     Socioeconomic History     Marital status:      Spouse name: Not on file     Number of children: Not on file     Years of education: Not on file     Highest education level: Not on file   Occupational History     Not on file   Tobacco Use     Smoking status: Former     Packs/day: 1.00     Years: 20.00     Pack years: 20.00     Types: Cigarettes     Quit date: 2020     Years since quittin.2     Smokeless tobacco: Never   Vaping Use     Vaping Use: Never used   Substance and Sexual Activity     Alcohol use: Not Currently     Drug use: Never     Sexual activity: Not on file   Other Topics Concern     Not on file   Social History Narrative     Not on file     Social Determinants of Health     Financial Resource Strain: Not on file   Food Insecurity: Not on file   Transportation Needs: Not on file   Physical Activity: Not on file   Stress: Not on file   Social Connections: Not on file   Intimate Partner Violence: Not on file   Housing Stability: Not on file           Medications  Allergies   Current Outpatient Medications   Medication Sig Dispense Refill     aspirin 81 mg chewable tablet [ASPIRIN 81 MG CHEWABLE TABLET] Chew 1 tablet (81 mg total) daily. Take aspirin indefinitely (for the rest of your life). 30 tablet 11     metoprolol succinate ER (TOPROL XL) 50 MG 24 hr tablet Take 1 tablet (50 mg) by mouth daily 90 tablet 3     rosuvastatin (CRESTOR) 40 MG tablet TAKE 1 TABLET(40 MG) BY MOUTH AT BEDTIME 90 tablet 1       Allergies   Allergen Reactions     Penicillins Hives          Lab Results    Chemistry/lipid CBC Cardiac Enzymes/BNP/TSH/INR   Recent Labs   Lab Test 11/15/21  1603   CHOL 160   HDL 47   LDL 91   TRIG 109     Recent Labs   Lab Test 11/15/21  1603 21  1024 21  1156   LDL 91 90 114     Recent Labs   Lab Test 21  1024      POTASSIUM 4.7   CHLORIDE 105   CO2 29      BUN 16   CR 0.96   GFRESTIMATED >60   ANITA 9.4     Recent Labs   Lab Test 21  1024  11/12/20  0846 08/17/20  0826   CR 0.96 1.00 0.92     No results for input(s): A1C in the last 83015 hours.       Recent Labs   Lab Test 08/07/20  0706   WBC 12.5*   HGB 14.8   HCT 43.4   MCV 90        Recent Labs   Lab Test 08/07/20  0706 08/06/20  0257 08/05/20  1240   HGB 14.8 14.9 16.0    Recent Labs   Lab Test 08/07/20  0706 08/06/20  0257 08/05/20  1537   TROPONINI 24.96* 59.22* 0.10     No results for input(s): BNP, NTBNPI, NTBNP in the last 19953 hours.  No results for input(s): TSH in the last 76499 hours.  Recent Labs   Lab Test 08/05/20  1240   INR 0.93        Michelle Poe MD    Thank you for allowing me to participate in the care of your patient.      Sincerely,     Michelle Poe MD     [facility]  cc:   Michelle Poe MD  [address]  [address]  [place] [address]

## 2022-11-03 NOTE — PROGRESS NOTES
HEART CARE ENCOUNTER CONSULTATON NOTE      [facility]  [phone]      Assessment/Recommendations   Assessment:    1.  Coronary artery disease: Status post inferior myocardial infarction status post PCI of RCA on 8/5/2020  2.  Hyperlipidemia: Severe with an LDL of 238 improved to LDL of 90s on Crestor 40 mg daily.  We will repeat direct LDL today.  Goal LDL is less than 70.  3.  Heavy smoking quit on 8/5/2020  4.  Recurrent palpitations occurring almost daily towards the end of the day and will proceed with a 14-day event monitor for further evaluation.  5.  Possible EBONY with daytime fatigue     Plan:  1.  Continue Crestor 40 mg daily.  Will check direct LDL today.  Consider adding Zetia if LDL still not within goals  2.  Continue smoking cessation   3.  Continue aspirin.  We will change metoprolol dosing to Toprol-XL 50 mg daily from metoprolol tartrate 25 mg twice daily.  4.  Continue diet and exercise  5.  Sleep study evaluation  Follow-up in 1 year       History of Present Illness/Subjective    HPI: Fredrick Lockhart is a 52 year old male  history of heavy smoking, hyperlipidemia, hypertension who suffered an inferior ST elevation myocardial infarction status post PCI of RCA in August 2020 and is here today for follow-up.  He was also found to have nonsustained ventricular tachycardia and post MI atrial fibrillation that converted to normal sinus rhythm hours after his intervention.  He has not had any further known atrial fibrillation.      Patient today reports that towards the end of his 12-hour shift he is noticing an irregular heartbeat and skipped heartbeats.  He works in [place] and lives in [place].  He does not have much time to exercise and admits to lack of exercise.  Contributing to this he had right carpal tunnel surgery and is going to be undergoing left carpal tunnel surgery shortly.  Denies any problems of shortness of breath or chest pain.  He reports compliance with  "medications.       Physical Examination  Review of Systems   Vitals: /82 (BP Location: Right arm, Patient Position: Sitting, Cuff Size: Adult Large)   Pulse 60   Resp 16   Ht 1.778 m (5' 10\")   Wt 109.4 kg (241 lb 3.2 oz)   BMI 34.61 kg/m    BMI= Body mass index is 34.61 kg/m .  Wt Readings from Last 3 Encounters:   11/03/22 109.4 kg (241 lb 3.2 oz)   09/15/21 110.7 kg (244 lb)   03/19/21 104.4 kg (230 lb 3.2 oz)       General Appearance:   no distress, normal body habitus   ENT/Mouth: membranes moist, no oral lesions or bleeding gums.      EYES:  no scleral icterus, normal conjunctivae   Neck: no carotid bruits or thyromegaly   Chest/Lungs:   lungs are clear to auscultation   Cardiovascular:   Regular. Normal first and second heart sounds with no murmurs  no edema bilaterally    Abdomen:  no organomegaly, masses, bruits, or tenderness; bowel sounds are present   Extremities: no cyanosis or clubbing   Skin: no xanthelasma, warm.    Neurologic: normal  bilateral, no tremors     Psychiatric: alert and oriented x3, calm        Please refer above for cardiac ROS details.        Medical History  Surgical History Family History Social History   Coronary artery disease  Dyslipidemia Past Surgical History:   Procedure Laterality Date     CV CORONARY ANGIOGRAM N/A 8/5/2020    Procedure: Coronary Angiogram;  Surgeon: Norma Betancourt MD;  Location: [facility];  Service: Cardiology     CV LEFT HEART CATHETERIZATION WITHOUT LEFT VENTRICULOGRAM Left 8/5/2020    Procedure: Left Heart Catheterization Without Left Ventriculogram;  Surgeon: Norma Betancourt MD;  Location: [facility];  Service: Cardiology     No significant family history of heart disease   Social History     Socioeconomic History     Marital status:      Spouse name: Not on file     Number of children: Not on file     Years of education: Not on file     Highest education level: Not on file   Occupational History     Not on " file   Tobacco Use     Smoking status: Former     Packs/day: 1.00     Years: 20.00     Pack years: 20.00     Types: Cigarettes     Quit date: 2020     Years since quittin.2     Smokeless tobacco: Never   Vaping Use     Vaping Use: Never used   Substance and Sexual Activity     Alcohol use: Not Currently     Drug use: Never     Sexual activity: Not on file   Other Topics Concern     Not on file   Social History Narrative     Not on file     Social Determinants of Health     Financial Resource Strain: Not on file   Food Insecurity: Not on file   Transportation Needs: Not on file   Physical Activity: Not on file   Stress: Not on file   Social Connections: Not on file   Intimate Partner Violence: Not on file   Housing Stability: Not on file           Medications  Allergies   Current Outpatient Medications   Medication Sig Dispense Refill     aspirin 81 mg chewable tablet [ASPIRIN 81 MG CHEWABLE TABLET] Chew 1 tablet (81 mg total) daily. Take aspirin indefinitely (for the rest of your life). 30 tablet 11     metoprolol succinate ER (TOPROL XL) 50 MG 24 hr tablet Take 1 tablet (50 mg) by mouth daily 90 tablet 3     rosuvastatin (CRESTOR) 40 MG tablet TAKE 1 TABLET(40 MG) BY MOUTH AT BEDTIME 90 tablet 1       Allergies   Allergen Reactions     Penicillins Hives          Lab Results    Chemistry/lipid CBC Cardiac Enzymes/BNP/TSH/INR   Recent Labs   Lab Test 11/15/21  1603   CHOL 160   HDL 47   LDL 91   TRIG 109     Recent Labs   Lab Test 11/15/21  1603 21  1024 21  1156   LDL 91 90 114     Recent Labs   Lab Test 21  1024      POTASSIUM 4.7   CHLORIDE 105   CO2 29      BUN 16   CR 0.96   GFRESTIMATED >60   ANITA 9.4     Recent Labs   Lab Test 21  1024 20  0846 20  0826   CR 0.96 1.00 0.92     No results for input(s): A1C in the last 72477 hours.       Recent Labs   Lab Test 20  0706   WBC 12.5*   HGB 14.8   HCT 43.4   MCV 90        Recent Labs   Lab Test  08/07/20  0706 08/06/20  0257 08/05/20  1240   HGB 14.8 14.9 16.0    Recent Labs   Lab Test 08/07/20  0706 08/06/20  0257 08/05/20  1537   TROPONINI 24.96* 59.22* 0.10     No results for input(s): BNP, NTBNPI, NTBNP in the last 87597 hours.  No results for input(s): TSH in the last 93318 hours.  Recent Labs   Lab Test 08/05/20  1240   INR 0.93        Michelle Poe MD

## 2022-11-07 ENCOUNTER — HOSPITAL ENCOUNTER (OUTPATIENT)
Dept: CARDIOLOGY | Facility: HOSPITAL | Age: 52
Discharge: HOME | End: 2022-11-07
Attending: INTERNAL MEDICINE | Admitting: INTERNAL MEDICINE
Payer: COMMERCIAL

## 2022-11-07 DIAGNOSIS — I25.10 CORONARY ARTERY DISEASE INVOLVING NATIVE CORONARY ARTERY OF NATIVE HEART WITHOUT ANGINA PECTORIS: ICD-10-CM

## 2022-11-07 PROCEDURE — 93272 ECG/REVIEW INTERPRET ONLY: CPT | Performed by: INTERNAL MEDICINE

## 2022-11-07 PROCEDURE — 93270 REMOTE 30 DAY ECG REV/REPORT: CPT

## 2022-11-08 DIAGNOSIS — E78.5 DYSLIPIDEMIA: ICD-10-CM

## 2022-11-08 DIAGNOSIS — I25.10 CORONARY ARTERY DISEASE INVOLVING NATIVE CORONARY ARTERY OF NATIVE HEART WITHOUT ANGINA PECTORIS: Primary | ICD-10-CM

## 2022-11-08 DIAGNOSIS — I21.11 ST ELEVATION MYOCARDIAL INFARCTION INVOLVING RIGHT CORONARY ARTERY (H): ICD-10-CM

## 2022-11-08 RX ORDER — EZETIMIBE 10 MG/1
10 TABLET ORAL DAILY
Qty: 90 TABLET | Refills: 3 | Status: SHIPPED | OUTPATIENT
Start: 2022-11-08 | End: 2023-10-18

## 2022-11-16 ENCOUNTER — TELEPHONE (OUTPATIENT)
Dept: CARDIOLOGY | Facility: CLINIC | Age: 52
End: 2022-11-16

## 2022-11-16 DIAGNOSIS — I21.11 ST ELEVATION MYOCARDIAL INFARCTION INVOLVING RIGHT CORONARY ARTERY (H): ICD-10-CM

## 2022-11-16 NOTE — TELEPHONE ENCOUNTER
"Please review Chente BURNS notification on [employer] that occurred 11-16-22 @ 0147 showing \"SR / SB with atrial couplets and 3.1 sec pause\" - event monitor pending completion 11-21-22 - follow-up pending 11/2023 - any new orders at this time?  mg  "

## 2022-11-16 NOTE — TELEPHONE ENCOUNTER
----- Message from DESTIN Leblanc sent at 11/16/2022  7:24 AM CST -----  Regarding: Heart Monitor Notification  Good morning!    There is a heart monitor notification for Dr. Poe's review in the [employer] folder on [employer]    Thank you and have a great day!    Elmo Cole MS CEP  Exercise Physiologist

## 2022-11-19 ENCOUNTER — HEALTH MAINTENANCE LETTER (OUTPATIENT)
Age: 52
End: 2022-11-19

## 2022-11-21 RX ORDER — METOPROLOL SUCCINATE 50 MG/1
25 TABLET, EXTENDED RELEASE ORAL DAILY
Qty: 90 TABLET | Refills: 3
Start: 2022-11-21 | End: 2023-11-06

## 2022-11-21 NOTE — TELEPHONE ENCOUNTER
Msg rec'd 11-21-22 @ 1130:  Michelle Poe MD Gorshe, Maureen, LISA  Recommend decreasing toprol xl to 25 mg daily, complete event monitor

## 2022-11-21 NOTE — TELEPHONE ENCOUNTER
Phone call to patient - informed him of Dr. Poe's recommendations after reviewing [employer] alert - patient verbalized understanding of dose changes, offered no questions / concerns at this time and confirmed he will be returning monitor later today - reassured patient that he would be contacted with further recommendations after results reviewed -follow-up pending in 1yr.  mg

## 2022-12-08 ENCOUNTER — OFFICE VISIT (OUTPATIENT)
Dept: SLEEP MEDICINE | Facility: CLINIC | Age: 52
End: 2022-12-08
Attending: INTERNAL MEDICINE
Payer: COMMERCIAL

## 2022-12-08 VITALS
HEART RATE: 66 BPM | BODY MASS INDEX: 34.72 KG/M2 | SYSTOLIC BLOOD PRESSURE: 131 MMHG | DIASTOLIC BLOOD PRESSURE: 84 MMHG | OXYGEN SATURATION: 97 % | HEIGHT: 71 IN | WEIGHT: 248 LBS

## 2022-12-08 DIAGNOSIS — R06.81 WITNESSED EPISODE OF APNEA: Primary | ICD-10-CM

## 2022-12-08 DIAGNOSIS — G47.10 HYPERSOMNIA: ICD-10-CM

## 2022-12-08 DIAGNOSIS — R06.83 SNORING: ICD-10-CM

## 2022-12-08 DIAGNOSIS — R41.3 MEMORY LOSS: ICD-10-CM

## 2022-12-08 DIAGNOSIS — I21.11 ST ELEVATION MYOCARDIAL INFARCTION INVOLVING RIGHT CORONARY ARTERY (H): ICD-10-CM

## 2022-12-08 PROBLEM — I25.10 CORONARY ARTERY DISEASE INVOLVING NATIVE CORONARY ARTERY OF NATIVE HEART: Status: ACTIVE | Noted: 2022-12-08

## 2022-12-08 PROBLEM — I10 HTN (HYPERTENSION): Status: ACTIVE | Noted: 2020-08-17

## 2022-12-08 PROBLEM — E78.5 DYSLIPIDEMIA: Status: ACTIVE | Noted: 2020-08-17

## 2022-12-08 PROCEDURE — 99204 OFFICE O/P NEW MOD 45 MIN: CPT | Performed by: INTERNAL MEDICINE

## 2022-12-08 NOTE — PROGRESS NOTES
Additional 15 minutes on the date of service was spent performing the following:    -Preparing to see the patient  -Obtaining and/or reviewing separately obtained history   -Ordering medications, tests, or procedures   -Documenting clinical information in the electronic or other health record     Assessment and Plan:    In summary Fredrick Lockhart is a 52 year old year old male here for sleep disturbance.  1. Witness apnea/Hypersomnia/Snoring/Memory loss/CAD   Fredrick Lockhart has high risk for obstructive sleep apnea based on the history of witness apnea, hypersomnia, snoring and a crowded airway. I educated the patient on the underlying pathophysiology of obstructive sleep apnea. We reviewed the risks associated with sleep apnea, including increased cardiovascular risk and overall death. We talked about treatments briefly. I recommend getting a Home sleep study. The patient should return to the clinic to discuss results and treatment option in a patient-centered approach.    The patient has given me permission to put in an order for CPAP if his sleep study is positive for EBONY with [facility].    History of present illness:    He is a 52 year old male who comes to the clinic with a chief complaint of excessive daytime sleepiness that been going on for at least 2 years.  Patient has been informed by his ex-wife in the past that he has significant pauses in his breathing during sleep followed by loud snoring.  He also complains that his memory has deteriorated due to poor sleep quality.  The patient is also a survivor of a myocardial infarction.  He also complains of frequently waking up with sore throats in the morning as well as dry mouth.     Ideal Sleep-Wake Cycle(devoid of societal pressure):    Patient would try to initiate sleep at around 9-10 PM with a sleep latency of less than 20 minutes. The patient would have 1 awakening. Final wake up time is around 7 AM.    SI:  CONOR Total Score: 9  Total score -  [place]: 10 (12/8/2022  8:00 AM)    Patient told to return in one week after the sleep study is interpreted.    Patient Active Problem List   Diagnosis     Coronary artery disease involving native coronary artery of native heart     Dyslipidemia     HTN (hypertension)     ST elevation myocardial infarction involving right coronary artery (H)       Past Medical History  Past Medical History:   Diagnosis Date     Bilateral carpal tunnel syndrome         Past Surgical History  Past Surgical History:   Procedure Laterality Date     CV CORONARY ANGIOGRAM N/A 8/5/2020    Procedure: Coronary Angiogram;  Surgeon: Norma Betancourt MD;  Location: [facility] Cath Lab;  Service: Cardiology     CV LEFT HEART CATHETERIZATION WITHOUT LEFT VENTRICULOGRAM Left 8/5/2020    Procedure: Left Heart Catheterization Without Left Ventriculogram;  Surgeon: Norma Betancourt MD;  Location: [facility] Cath Lab;  Service: Cardiology        Meds  Current Outpatient Medications   Medication Sig Dispense Refill     aspirin 81 mg chewable tablet [ASPIRIN 81 MG CHEWABLE TABLET] Chew 1 tablet (81 mg total) daily. Take aspirin indefinitely (for the rest of your life). 30 tablet 11     ezetimibe (ZETIA) 10 MG tablet Take 1 tablet (10 mg) by mouth daily 90 tablet 3     metoprolol succinate ER (TOPROL XL) 50 MG 24 hr tablet Take 0.5 tablets (25 mg) by mouth daily 90 tablet 3     rosuvastatin (CRESTOR) 40 MG tablet TAKE 1 TABLET(40 MG) BY MOUTH AT BEDTIME 90 tablet 1        Allergies  Cat hair extract and Penicillins     Social History  Social History     Socioeconomic History     Marital status:      Spouse name: Not on file     Number of children: Not on file     Years of education: Not on file     Highest education level: Not on file   Occupational History     Not on file   Tobacco Use     Smoking status: Former     Packs/day: 1.00     Years: 20.00     Pack years: 20.00     Types: Cigarettes     Quit date: 8/7/2020     Years since quitting:  "2.3     Smokeless tobacco: Never   Vaping Use     Vaping Use: Never used   Substance and Sexual Activity     Alcohol use: Not Currently     Drug use: Never     Sexual activity: Not on file   Other Topics Concern     Not on file   Social History Narrative     Not on file     Social Determinants of Health     Financial Resource Strain: Not on file   Food Insecurity: Not on file   Transportation Needs: Not on file   Physical Activity: Not on file   Stress: Not on file   Social Connections: Not on file   Intimate Partner Violence: Not on file   Housing Stability: Not on file        Family History  Family History   Problem Relation Age of Onset     Snoring Father       Review of Systems:  Constitutional: Negative except as noted in HPI.   Eyes: Negative except as noted in HPI.   ENT: Negative except as noted in HPI.   Cardiovascular: Negative except as noted in HPI.   Respiratory: Negative except as noted in HPI.   Gastrointestinal: Negative except as noted in HPI.   Genitourinary: Negative except as noted in HPI.   Musculoskeletal: Negative except as noted in HPI.   Integumentary: Negative except as noted in HPI.   Neurological: Negative except as noted in HPI.   Psychiatric: Negative except as noted in HPI.   Endocrine: Negative except as noted in HPI.   Hematologic/Lymphatic: Negative except as noted in HPI.      Physical Exam:  BP (!) 151/89   Ht 1.798 m (5' 10.79\")   Wt 112.5 kg (248 lb)   SpO2 97%   BMI 34.80 kg/m    BMI:Body mass index is 34.8 kg/m .   GEN: NAD, obese  Head: Normocephalic.  EYES: PERRLA, EOMI  ENT: Oropharynx is clear, Harmon class 4+ airway.   Nasal mucosa is moist without erythema  Neck : Thyroid is within normal limits.   CV: Regular rate and rhythm, S1 & S2 positive.  LUNGS: Bilateral breathsounds heard.   ABDOMEN: Positive bowel sounds in all quadrants, soft, no rebound or guarding  MUSCULOSKELETAL: Bilateral trace leg swelling  SKIN: warm, dry, no rashes  Neurological: Alert, oriented " to time, place, and person. Gait is normal. Strength 5/5 in all extremities.  Psych: normal mood, normal affect     Labs/Studies:     No results found for: PH, PHARTERIAL, PO2, [id number], SAT, PCO2, HCO3, BASEEXCESS, DOMINGO, BEB  No results found for: TSH  Lab Results   Component Value Date     05/05/2021     11/12/2020     Lab Results   Component Value Date    HGB 14.8 08/07/2020    HGB 14.9 08/06/2020     Lab Results   Component Value Date    BUN 16 05/05/2021    BUN 15 11/12/2020    CR 0.96 05/05/2021    CR 1.00 11/12/2020     Lab Results   Component Value Date    AST 29 11/15/2021    AST 31 05/05/2021    ALT 45 11/15/2021    ALT 57 (H) 05/05/2021    ALKPHOS 94 05/05/2021    ALKPHOS 94 03/19/2021    BILITOTAL 0.5 05/05/2021    BILITOTAL 1.0 03/19/2021     No results found for: UAMP, UBARB, BENZODIAZEUR, UCANN, UCOC, OPIT, UPCP      Patient verbalized understanding of these issues, agrees with the plan and all questions were answered today. Patient was given an opportuntity to voice any other symptoms or concerns not listed above. Patient did not have any other symptoms or concerns.         Amrit Khan DO,   Board Certified in Internal Medicine and Sleep Medicine    (Note created with Dragon voice recognition and unintended spelling errors and word substitutions may occur)

## 2022-12-08 NOTE — NURSING NOTE
Home sleep test and return visit has been scheduled. HST instruction information send via  [employer]. AVS given to patient.     Denise Maharaj MIKE  [facility] [place]

## 2022-12-08 NOTE — PATIENT INSTRUCTIONS
What is a Home Sleep Study?    your doctor can give you a portable sleep monitor to use at home, so you don t have to spend the night in the sleep lab. But you should use a portable monitor only if:   ?Your doctor thinks you have a condition that makes you stop breathing for short periods while you are asleep, called  sleep apnea.    ?You do not have other serious medical problems, such as heart disease or lung disease.    Please bring the home sleep study device back to the sleep center as soon as you are finished with it so we can score it.     The cost of care estimate line is [phone]. They are able to give the patient an estimate of the charges and also an estimate of their insurance coverage/patient responsibility.   After your sleep study is performed, please call us at [phone] or [phone] to schedule for a follow up to review the results of the sleep study.    Consider using one tab of low dose melatonin 3 mg or less on the night of the study.    It is completely voluntary.    Do not drive or operate machinery after intake of melatonin.     Due to the pandemic, we have many people waiting in line for sleep studies- this wait list is improving each week.   You should receive a call within 2 weeks from our staff to schedule you for  your test.   Depending on the delay in approval by your insurance carrier, the study will be completed within a few days to 2 weeks of that call.    Please make every effort you can to sleep on your back with one pillow behind your head.    Please also call your insurance company next week to see if your sleep study is approved and find out your co-pay.    Fredrick to follow up with Primary Care provider regarding elevated blood pressure.

## 2022-12-08 NOTE — NURSING NOTE
"Chief Complaint   Patient presents with     Consult     Per patient \"was told by people passed out and not breathing\" stop breathing in sleep.        Initial BP (!) 151/89   Ht 1.798 m (5' 10.79\")   Wt 112.5 kg (248 lb)   SpO2 97%   BMI 34.80 kg/m   Estimated body mass index is 34.8 kg/m  as calculated from the following:    Height as of this encounter: 1.798 m (5' 10.79\").    Weight as of this encounter: 112.5 kg (248 lb).a    Medication Reconciliation: complete    Neck circumference: 43 centimeters.    DME: n/a    VLADIMIR Hawthorne  [facility]      "

## 2023-01-16 ENCOUNTER — LAB (OUTPATIENT)
Dept: CARDIOLOGY | Facility: CLINIC | Age: 53
End: 2023-01-16
Payer: COMMERCIAL

## 2023-01-16 DIAGNOSIS — I25.10 CORONARY ARTERY DISEASE INVOLVING NATIVE CORONARY ARTERY OF NATIVE HEART WITHOUT ANGINA PECTORIS: ICD-10-CM

## 2023-01-16 DIAGNOSIS — I21.11 ST ELEVATION MYOCARDIAL INFARCTION INVOLVING RIGHT CORONARY ARTERY (H): ICD-10-CM

## 2023-01-16 DIAGNOSIS — E78.5 DYSLIPIDEMIA: ICD-10-CM

## 2023-01-16 LAB
CHOLEST SERPL-MCNC: 134 MG/DL
HDLC SERPL-MCNC: 38 MG/DL
LDLC SERPL CALC-MCNC: 64 MG/DL
NONHDLC SERPL-MCNC: 96 MG/DL
TRIGL SERPL-MCNC: 159 MG/DL

## 2023-01-16 PROCEDURE — 36415 COLL VENOUS BLD VENIPUNCTURE: CPT

## 2023-01-16 PROCEDURE — 80061 LIPID PANEL: CPT

## 2023-01-18 ENCOUNTER — OFFICE VISIT (OUTPATIENT)
Dept: SLEEP MEDICINE | Facility: CLINIC | Age: 53
End: 2023-01-18
Payer: COMMERCIAL

## 2023-01-18 DIAGNOSIS — I21.11 ST ELEVATION MYOCARDIAL INFARCTION INVOLVING RIGHT CORONARY ARTERY (H): ICD-10-CM

## 2023-01-18 DIAGNOSIS — G47.10 HYPERSOMNIA: ICD-10-CM

## 2023-01-18 DIAGNOSIS — R41.3 MEMORY LOSS: ICD-10-CM

## 2023-01-18 DIAGNOSIS — R06.83 SNORING: ICD-10-CM

## 2023-01-18 DIAGNOSIS — R06.81 WITNESSED EPISODE OF APNEA: ICD-10-CM

## 2023-01-18 PROCEDURE — G0399 HOME SLEEP TEST/TYPE 3 PORTA: HCPCS | Performed by: INTERNAL MEDICINE

## 2023-01-18 NOTE — PROGRESS NOTES
Pt is completing a home sleep test. Pt was instructed on how to put on the Noxturnal T3 device and associated equipment before going to bed and given the opportunity to practice putting it on before leaving the sleep center. Pt was reminded to bring the home sleep test kit back to the center tomorrow, at agreed upon time for download and reporting.     Neck circumference: 43 cm/ 17 inches.    Kizzy Harper CMA, HST Specialist  [place] [facility]

## 2023-01-19 ENCOUNTER — TELEPHONE (OUTPATIENT)
Dept: SLEEP MEDICINE | Facility: CLINIC | Age: 53
End: 2023-01-19

## 2023-01-19 ENCOUNTER — DOCUMENTATION ONLY (OUTPATIENT)
Dept: SLEEP MEDICINE | Facility: CLINIC | Age: 53
End: 2023-01-19
Payer: COMMERCIAL

## 2023-01-19 DIAGNOSIS — G47.33 OBSTRUCTIVE SLEEP APNEA: Primary | ICD-10-CM

## 2023-01-19 NOTE — PROGRESS NOTES
This HSAT was performed using a Noxturnal T3 device which recorded snore, sound, movement activity, body position, nasal pressure, oronasal thermal airflow, pulse, oximetry and both chest and abdominal respiratory effort. HSAT data was restricted to the time patient states they were in bed.     HSAT was scored using 1B 4% hypopnea rule.     HST AHI (Non-PAT): 50.1  Snoring was reported as loud.  Time with SpO2 below 89% was 83.6 minutes.   Overall signal quality was good.     Pt will follow up with sleep provider to determine appropriate therapy.

## 2023-01-19 NOTE — PROGRESS NOTES
Pt returned HST device. It was downloaded and forwarded data to the clinical specialist for scoring.    Kizzy Harper CMA, HST Specialist  [place] [facility]

## 2023-01-19 NOTE — TELEPHONE ENCOUNTER
The overnight polysomnography was reviewed.   The patient had severe obstructive sleep apnea.    I have called the patient and informed his of the results. I offered the patient the option of getting started on pressure therapy and the patient agreed. The patient would like to use the DME below. Patient declined the in lab titration study offer.    The prescription is in the chart.    Contact information for [employer] company:    [employer] Tel: [phone]    I will cancel the upcoming follow up visit and recommend the patient to call us back to reschedule after 4 weeks of PAP usage.    Thank you.

## 2023-01-19 NOTE — PROCEDURES
"HOME SLEEP STUDY INTERPRETATION    Patient: Fredrick Lockhart  MRN: [id number]  YOB: 1970  Study Date: 1/18/2023  Referring Provider: Vipul López MD  Ordering Provider: Amrit Khan DO     Indications for Home Study: Fredrick Lockhart is a 52 year old male who presents with symptoms suggestive of obstructive sleep apnea.    Estimated body mass index is 34.8 kg/m  as calculated from the following:    Height as of 12/8/22: 1.798 m (5' 10.79\").    Weight as of 12/8/22: 112.5 kg (248 lb).  Total score - [place]: 10 (12/8/2022  8:00 AM)    Data: A full night home sleep study was performed recording the standard physiologic parameters including body position, movement, sound, nasal pressure, thermal oral airflow, chest and abdominal movements with respiratory inductance plethysmography, and oxygen saturation by pulse oximetry. Pulse rate was estimated by oximetry recording. This study was considered adequate based on > 4 hours of quality oximetry and respiratory recording. As specified by the AASM Manual for the Scoring of Sleep and Associated events, version 2.3, Rule VIII.D 1B, 4% oxygen desaturation scoring for hypopneas is used as a standard of care on all home sleep apnea testing.    Analysis Time:  454 minutes    Respiration:   Sleep Associated Hypoxemia: sustained hypoxemia was present. Baseline oxygen saturation was 95%.  Time with saturation less than or equal to 88% was 83.6 minutes. The lowest oxygen saturation was 64%.   Snoring: Snoring was present.  Respiratory events: The home study revealed a presence of 224 obstructive apneas and 13 mixed and central apneas. There were 142 hypopneas resulting in a combined apnea/hypopnea index [AHI] of 50.1 events per hour.  AHI was 50.1 per hour supine, N/A per hour prone, N/A per hour on left side, and N/A per hour on right side.   Pattern: Excluding events noted above, respiratory rate and pattern was Normal.    Position: Percent of time spent: supine - " 100%, prone - 0%, on left - 0%, on right - 0%.    Heart Rate: By pulse oximetry normal rate was noted.     Assessment:   Severe obstructive sleep apnea.  Sleep associated hypoxemia was present.    Recommendations:  Consider auto-CPAP at 5-20 cmH2O or polysomnography with full night PAP titration.  Suggest optimizing sleep hygiene and avoiding sleep deprivation.  Weight management.    Diagnosis Code(s): Obstructive Sleep Apnea G47.33, Hypoxemia G47.36    Amrit Khan DO, January 19, 2023   Diplomate, American Board of Internal Medicine, Sleep Medicine

## 2023-02-21 ENCOUNTER — DOCUMENTATION ONLY (OUTPATIENT)
Dept: SLEEP MEDICINE | Facility: CLINIC | Age: 53
End: 2023-02-21
Payer: COMMERCIAL

## 2023-02-21 DIAGNOSIS — G47.33 OSA (OBSTRUCTIVE SLEEP APNEA): Primary | ICD-10-CM

## 2023-02-21 NOTE — PROGRESS NOTES
Patient was offered choice of vendor and chose [facility].  Patient Fredrick Lockhart was set up at [place]  on February 21, 2023. Patient received a Resmed Airsense 11 Pressures were set at 5-20 cm H2O.   Patient s ramp is 5 cm H2O for Auto and FLEX/EPR is 2.  Patient received a Resmed Mask name: AIRFIT F20  Full Face mask size Large, heated tubing and heated humidifier.  Patient does not need to meet compliance.   Kristen Roldan

## 2023-02-24 ENCOUNTER — DOCUMENTATION ONLY (OUTPATIENT)
Dept: SLEEP MEDICINE | Facility: CLINIC | Age: 53
End: 2023-02-24
Payer: COMMERCIAL

## 2023-02-24 NOTE — PROGRESS NOTES
3 day Sleep therapy management telephone visit    Diagnostic AHI:  50.1 HST    Confirmed with patient at time of call- Yes Patient is still interested in STM service       Subjective measures:  Patient reports CPAP has been going well and better than expected. Sometimes his humidifier runs out of water. He feels better during the daytime.        Order settings:  CPAP MIN CPAP MAX   5 cm H2O 20 cm H2O       Device settings:  CPAP MIN CPAP MAX EPR RESMED SOFT RESPONSE SETTING   5.0 cm  H20 20.0 cm  H20 TWO OFF       Compliance 100 %    Assessment: [place] usage under four hours      Action plan: Patient to have 14 day STM visit. Patient has a follow up visit scheduled:   no and not required by insurance    Replacement device: No  STM ordered by provider: Yes     Total time spent on accessing and  interpreting remote patient PAP therapy data  10 minutes    Total time spent counseling, coaching  and reviewing PAP therapy data with patient  3 minutes    [id number] no

## 2023-03-08 ENCOUNTER — DOCUMENTATION ONLY (OUTPATIENT)
Dept: SLEEP MEDICINE | Facility: CLINIC | Age: 53
End: 2023-03-08
Payer: COMMERCIAL

## 2023-03-08 NOTE — PROGRESS NOTES
14  DAY STM VISIT    Diagnostic AHI:  50.1  HST    Message left for patient to return call.     Assessment: Pt not meeting objective benchmarks for compliance       Action plan: waiting for patient to return call.  and pt to have 30 day STM visit.      Device type: Auto-CPAP    PAP settings: CPAP min 5.0 cm  H20       CPAP max 20.0 cm  H20      95th% pressure 17.6 cm  H20        RESMED EPR level Setting: TWO    RESMED Soft response setting:  OFF    Mask type:  Full Face Mask    Objective measures: 14 day rolling measures      Compliance  46 %      Leak  25.57  lpm  last  upload      AHI 8.59   last  upload      Average number of minutes 245      Objective measure goal  Compliance   Goal >70%  Leak   Goal < 24 lpm  AHI  Goal < 5  Usage  Goal >240        Total time spent on accessing and interpreting remote patient PAP therapy data  10 minutes    Total time spent counseling, coaching  and reviewing PAP therapy data with patient  1 minutes    [id number]  [id number]  no (3 day STM)

## 2023-03-29 ENCOUNTER — DOCUMENTATION ONLY (OUTPATIENT)
Dept: SLEEP MEDICINE | Facility: CLINIC | Age: 53
End: 2023-03-29
Payer: COMMERCIAL

## 2023-03-29 NOTE — PROGRESS NOTES
30 DAY STM VISIT    Diagnostic AHI:  50.1 HST    PAP settings:  CPAP MIN CPAP MAX 95TH % PRESSURE EPR RESMED SOFT RESPONSE SETTING   5.0 cm  H20 20.0 cm  H20 0 cm  H20  TWO OFF     Device type: Auto-CPAP  Mask type:  Full Face Mask    Objective measures: 14 day rolling measures:    COMPLIANCE LEAK AHI AVERAGE USE IN MINUTES   0 % 0 0 0   GOAL >70% GOAL < 24 LPM GOAL <5 GOAL >240        Assessment: Pt not meeting objective benchmarks for compliance  Patient failing following subjective benchmarks: overwhelm.  Subjective measures: Patient reports that he has had some struggle lately with CPAP where he is unable to use it. He could not point to any specific discomforts, but he has had a high workload lately that has disturbed his sleep. He has not used CPAP lately but hopes to restart soon. Will contact patient in 1 week to see how he feels once he resumes CPAP.   Action plan: Pt to have call in 1 week.  Patient has not scheduled a follow up visit.     Total time spent on accessing and interpreting remote patient PAP therapy data  10 minutes    Total time spent counseling, coaching  and reviewing PAP therapy data with patient  3 minutes     [id number] this call  [id number] no  at 3 or 14 day [facility]

## 2023-04-06 ENCOUNTER — DOCUMENTATION ONLY (OUTPATIENT)
Dept: SLEEP MEDICINE | Facility: CLINIC | Age: 53
End: 2023-04-06
Payer: COMMERCIAL

## 2023-04-06 NOTE — PROGRESS NOTES
STM Recheck. Called patient to check in on their progress with CPAP. Unable to Leave voice message for patient to call back-  Full

## 2023-09-10 ENCOUNTER — HEALTH MAINTENANCE LETTER (OUTPATIENT)
Age: 53
End: 2023-09-10

## 2023-10-18 DIAGNOSIS — I25.10 CORONARY ARTERY DISEASE INVOLVING NATIVE CORONARY ARTERY OF NATIVE HEART WITHOUT ANGINA PECTORIS: ICD-10-CM

## 2023-10-18 DIAGNOSIS — E78.5 DYSLIPIDEMIA: ICD-10-CM

## 2023-10-18 DIAGNOSIS — I21.11 ST ELEVATION MYOCARDIAL INFARCTION INVOLVING RIGHT CORONARY ARTERY (H): ICD-10-CM

## 2023-10-18 RX ORDER — EZETIMIBE 10 MG/1
10 TABLET ORAL DAILY
Qty: 90 TABLET | Refills: 0 | Status: SHIPPED | OUTPATIENT
Start: 2023-10-18 | End: 2024-01-12

## 2023-11-04 DIAGNOSIS — I21.11 ST ELEVATION MYOCARDIAL INFARCTION INVOLVING RIGHT CORONARY ARTERY (H): ICD-10-CM

## 2023-11-06 RX ORDER — METOPROLOL SUCCINATE 50 MG/1
50 TABLET, EXTENDED RELEASE ORAL DAILY
Qty: 90 TABLET | Refills: 0 | Status: SHIPPED | OUTPATIENT
Start: 2023-11-06 | End: 2024-01-12

## 2024-01-12 ENCOUNTER — OFFICE VISIT (OUTPATIENT)
Dept: CARDIOLOGY | Facility: CLINIC | Age: 54
End: 2024-01-12
Payer: COMMERCIAL

## 2024-01-12 VITALS
BODY MASS INDEX: 36.79 KG/M2 | HEIGHT: 70 IN | HEART RATE: 67 BPM | SYSTOLIC BLOOD PRESSURE: 142 MMHG | RESPIRATION RATE: 16 BRPM | DIASTOLIC BLOOD PRESSURE: 92 MMHG | WEIGHT: 257 LBS

## 2024-01-12 DIAGNOSIS — E66.812 CLASS 2 SEVERE OBESITY DUE TO EXCESS CALORIES WITH SERIOUS COMORBIDITY IN ADULT, UNSPECIFIED BMI (H): Primary | ICD-10-CM

## 2024-01-12 DIAGNOSIS — E78.5 DYSLIPIDEMIA: ICD-10-CM

## 2024-01-12 DIAGNOSIS — I25.10 CORONARY ARTERY DISEASE INVOLVING NATIVE CORONARY ARTERY OF NATIVE HEART WITHOUT ANGINA PECTORIS: ICD-10-CM

## 2024-01-12 DIAGNOSIS — I21.11 ST ELEVATION MYOCARDIAL INFARCTION INVOLVING RIGHT CORONARY ARTERY (H): ICD-10-CM

## 2024-01-12 DIAGNOSIS — E66.01 CLASS 2 SEVERE OBESITY DUE TO EXCESS CALORIES WITH SERIOUS COMORBIDITY IN ADULT, UNSPECIFIED BMI (H): Primary | ICD-10-CM

## 2024-01-12 PROCEDURE — 99214 OFFICE O/P EST MOD 30 MIN: CPT | Performed by: INTERNAL MEDICINE

## 2024-01-12 RX ORDER — EZETIMIBE 10 MG/1
10 TABLET ORAL DAILY
Qty: 90 TABLET | Refills: 3 | Status: SHIPPED | OUTPATIENT
Start: 2024-01-12

## 2024-01-12 RX ORDER — METOPROLOL SUCCINATE 50 MG/1
50 TABLET, EXTENDED RELEASE ORAL DAILY
Qty: 90 TABLET | Refills: 3 | Status: SHIPPED | OUTPATIENT
Start: 2024-01-12

## 2024-01-12 RX ORDER — ROSUVASTATIN CALCIUM 40 MG/1
40 TABLET, COATED ORAL AT BEDTIME
Qty: 90 TABLET | Refills: 3 | Status: SHIPPED | OUTPATIENT
Start: 2024-01-12

## 2024-01-12 NOTE — PATIENT INSTRUCTIONS
Fredrick IRBY Richy,     It was a pleasure to see you in the office today. My recommendations for you include:   1. Goal blood pressure < 140/90  2. Follow up in one year     Please do not hesitate to call the [facility] clinic with any questions or concerns at [phone].    Sincerely,     Michelle Poe MD

## 2024-01-12 NOTE — PROGRESS NOTES
"  HEART CARE ENCOUNTER CONSULTATON NOTE      [facility]  [phone]      Assessment/Recommendations   Assessment:    1.  Coronary artery disease: Status post inferior myocardial infarction status post PCI of RCA on 8/5/2020  2.  Hyperlipidemia: Severe with an LDL of 238, now well controlled with LDL < 70 on high dose crestor and zetia.   3.  Heavy smoking quit on 8/5/2020  4.  Severe EBONY on CPAP     Plan:  1.  Continue Crestor 40 mg daily and zetia  2.  Continue smoking cessation   3.  Continue aspirin and Toprol-XL 50 mg daily  4.  Continue diet and exercise  5. Continue CPAP for EBONY  Follow-up in 1 year         History of Present Illness/Subjective    HPI: Fredrick Lockhart is a 53 year old male   history of heavy smoking, hyperlipidemia, hypertension who suffered an inferior ST elevation myocardial infarction status post PCI of RCA in August 2020 and is here today for follow-up.  He works in [place] and lives in [place]. He was diagnosed with severe sleep apnea this year and has been compliant with CPAP.  He has been following a keto diet recently and has been able to lose about 10 lbs.  Previous smoker and quit when he had his heart attack. Denies chest pain or dyspnea.          Physical Examination  Review of Systems   Vitals: BP (!) 142/92 (BP Location: Left arm, Patient Position: Sitting, Cuff Size: Adult Large)   Pulse 67   Resp 16   Ht 1.778 m (5' 10\")   Wt 116.6 kg (257 lb)   BMI 36.88 kg/m    BMI= Body mass index is 36.88 kg/m .  Wt Readings from Last 3 Encounters:   01/12/24 116.6 kg (257 lb)   12/08/22 112.5 kg (248 lb)   11/03/22 109.4 kg (241 lb 3.2 oz)       General Appearance:   no distress, normal body habitus   ENT/Mouth: membranes moist, no oral lesions or bleeding gums.      EYES:  no scleral icterus, normal conjunctivae   Neck: no carotid bruits or thyromegaly   Chest/Lungs:   lungs are clear to auscultation   Cardiovascular:   Regular. Normal first and second heart " sounds with no murmur no edema bilaterally    Abdomen:  bowel sounds are present   Extremities: no cyanosis or clubbing   Skin: no xanthelasma, warm.    Neurologic: normal  bilateral, no tremors     Psychiatric: alert and oriented x3, calm        Please refer above for cardiac ROS details.        Medical History  Surgical History Family History Social History   Past Medical History:   Diagnosis Date    Bilateral carpal tunnel syndrome      Past Surgical History:   Procedure Laterality Date    CV CORONARY ANGIOGRAM N/A 8/5/2020    Procedure: Coronary Angiogram;  Surgeon: Norma Betancourt MD;  Location: [facility] Cath Lab;  Service: Cardiology    CV LEFT HEART CATHETERIZATION WITHOUT LEFT VENTRICULOGRAM Left 8/5/2020    Procedure: Left Heart Catheterization Without Left Ventriculogram;  Surgeon: Norma Betancourt MD;  Location: [facility] Cath Lab;  Service: Cardiology    [place] HOME SLEEP TEST/TYPE 3 VIVIAN  1/19/2023          Family History   Problem Relation Age of Onset    Snoring Father         Social History     Socioeconomic History    Marital status:      Spouse name: Not on file    Number of children: Not on file    Years of education: Not on file    Highest education level: Not on file   Occupational History    Not on file   Tobacco Use    Smoking status: Former     Packs/day: 1.00     Years: 20.00     Additional pack years: 0.00     Total pack years: 20.00     Types: Cigarettes     Quit date: 8/7/2020     Years since quitting: 3.4    Smokeless tobacco: Never   Vaping Use    Vaping Use: Never used   Substance and Sexual Activity    Alcohol use: Not Currently    Drug use: Never    Sexual activity: Not on file   Other Topics Concern    Not on file   Social History Narrative    Not on file     Social Determinants of Health     Financial Resource Strain: Not on file   Food Insecurity: Not on file   Transportation Needs: Not on file   Physical Activity: Not on file   Stress: Not on file   Social  "Connections: Not on file   Interpersonal Safety: Not on file   Housing Stability: Not on file           Medications  Allergies   Current Outpatient Medications   Medication Sig Dispense Refill    aspirin 81 mg chewable tablet [ASPIRIN 81 MG CHEWABLE TABLET] Chew 1 tablet (81 mg total) daily. Take aspirin indefinitely (for the rest of your life). 30 tablet 11    ezetimibe (ZETIA) 10 MG tablet Take 1 tablet (10 mg) by mouth daily 90 tablet 3    metoprolol succinate ER (TOPROL XL) 50 MG 24 hr tablet Take 1 tablet (50 mg) by mouth daily 90 tablet 3    rosuvastatin (CRESTOR) 40 MG tablet Take 1 tablet (40 mg) by mouth at bedtime 90 tablet 3       Allergies   Allergen Reactions    Cat Hair Extract Other (See Comments)     Reaction: Sneezing    Penicillins Hives          Lab Results    Chemistry/lipid CBC Cardiac Enzymes/BNP/TSH/INR   Recent Labs   Lab Test 01/16/23  0812   CHOL 134   HDL 38*   LDL 64   TRIG 159*     Recent Labs   Lab Test 01/16/23  0812 11/03/22  0906 11/15/21  1603   LDL 64 84 91     Recent Labs   Lab Test 05/05/21  1024      POTASSIUM 4.7   CHLORIDE 105   CO2 29      BUN 16   CR 0.96   GFRESTIMATED >60   ANITA 9.4     Recent Labs   Lab Test 05/05/21  1024 11/12/20  0846 08/17/20  0826   CR 0.96 1.00 0.92     No results for input(s): \"A1C\" in the last 11736 hours.       Recent Labs   Lab Test 08/07/20  0706   WBC 12.5*   HGB 14.8   HCT 43.4   MCV 90        Recent Labs   Lab Test 08/07/20  0706 08/06/20  0257 08/05/20  1240   HGB 14.8 14.9 16.0    Recent Labs   Lab Test 08/07/20  0706 08/06/20  0257 08/05/20  1537   TROPONINI 24.96* 59.22* 0.10     No results for input(s): \"BNP\", \"NTBNPI\", \"NTBNP\" in the last 09217 hours.  No results for input(s): \"TSH\" in the last 46315 hours.  Recent Labs   Lab Test 08/05/20  1240   INR 0.93        Michelle Poe MD                                        "

## 2024-01-12 NOTE — LETTER
"1/12/2024    Vipul López MD  [facility] [address]  [facility] [address]    RE: Fredrick Lockhart       Dear Colleague,     I had the pleasure of seeing Fredrick Lockhart in the [facility]alth [facility].    HEART CARE ENCOUNTER CONSULTATON NOTE      [facility]  [phone]      Assessment/Recommendations   Assessment:    1.  Coronary artery disease: Status post inferior myocardial infarction status post PCI of RCA on 8/5/2020  2.  Hyperlipidemia: Severe with an LDL of 238, now well controlled with LDL < 70 on high dose crestor and zetia.   3.  Heavy smoking quit on 8/5/2020  4.  Severe EBONY on CPAP     Plan:  1.  Continue Crestor 40 mg daily and zetia  2.  Continue smoking cessation   3.  Continue aspirin and Toprol-XL 50 mg daily  4.  Continue diet and exercise  5. Continue CPAP for EBONY  Follow-up in 1 year         History of Present Illness/Subjective    HPI: Fredrick Lockhart is a 53 year old male   history of heavy smoking, hyperlipidemia, hypertension who suffered an inferior ST elevation myocardial infarction status post PCI of RCA in August 2020 and is here today for follow-up.  He works in [place] and lives in [place]. He was diagnosed with severe sleep apnea this year and has been compliant with CPAP.  He has been following a keto diet recently and has been able to lose about 10 lbs.  Previous smoker and quit when he had his heart attack. Denies chest pain or dyspnea.          Physical Examination  Review of Systems   Vitals: BP (!) 142/92 (BP Location: Left arm, Patient Position: Sitting, Cuff Size: Adult Large)   Pulse 67   Resp 16   Ht 1.778 m (5' 10\")   Wt 116.6 kg (257 lb)   BMI 36.88 kg/m    BMI= Body mass index is 36.88 kg/m .  Wt Readings from Last 3 Encounters:   01/12/24 116.6 kg (257 lb)   12/08/22 112.5 kg (248 lb)   11/03/22 109.4 kg (241 lb 3.2 oz)       General Appearance:   no distress, normal body habitus   ENT/Mouth: membranes moist, no oral lesions " or bleeding gums.      EYES:  no scleral icterus, normal conjunctivae   Neck: no carotid bruits or thyromegaly   Chest/Lungs:   lungs are clear to auscultation   Cardiovascular:   Regular. Normal first and second heart sounds with no murmur no edema bilaterally    Abdomen:  bowel sounds are present   Extremities: no cyanosis or clubbing   Skin: no xanthelasma, warm.    Neurologic: normal  bilateral, no tremors     Psychiatric: alert and oriented x3, calm        Please refer above for cardiac ROS details.        Medical History  Surgical History Family History Social History   Past Medical History:   Diagnosis Date    Bilateral carpal tunnel syndrome      Past Surgical History:   Procedure Laterality Date    CV CORONARY ANGIOGRAM N/A 8/5/2020    Procedure: Coronary Angiogram;  Surgeon: Norma Betancourt MD;  Location: [facility] Cath Lab;  Service: Cardiology    CV LEFT HEART CATHETERIZATION WITHOUT LEFT VENTRICULOGRAM Left 8/5/2020    Procedure: Left Heart Catheterization Without Left Ventriculogram;  Surgeon: Norma Betancourt MD;  Location: [facility] Cath Lab;  Service: Cardiology    [place] HOME SLEEP TEST/TYPE 3 VIVIAN  1/19/2023          Family History   Problem Relation Age of Onset    Snoring Father         Social History     Socioeconomic History    Marital status:      Spouse name: Not on file    Number of children: Not on file    Years of education: Not on file    Highest education level: Not on file   Occupational History    Not on file   Tobacco Use    Smoking status: Former     Packs/day: 1.00     Years: 20.00     Additional pack years: 0.00     Total pack years: 20.00     Types: Cigarettes     Quit date: 8/7/2020     Years since quitting: 3.4    Smokeless tobacco: Never   Vaping Use    Vaping Use: Never used   Substance and Sexual Activity    Alcohol use: Not Currently    Drug use: Never    Sexual activity: Not on file   Other Topics Concern    Not on file   Social History Narrative    Not on  "file     Social Determinants of Health     Financial Resource Strain: Not on file   Food Insecurity: Not on file   Transportation Needs: Not on file   Physical Activity: Not on file   Stress: Not on file   Social Connections: Not on file   Interpersonal Safety: Not on file   Housing Stability: Not on file           Medications  Allergies   Current Outpatient Medications   Medication Sig Dispense Refill    aspirin 81 mg chewable tablet [ASPIRIN 81 MG CHEWABLE TABLET] Chew 1 tablet (81 mg total) daily. Take aspirin indefinitely (for the rest of your life). 30 tablet 11    ezetimibe (ZETIA) 10 MG tablet Take 1 tablet (10 mg) by mouth daily 90 tablet 3    metoprolol succinate ER (TOPROL XL) 50 MG 24 hr tablet Take 1 tablet (50 mg) by mouth daily 90 tablet 3    rosuvastatin (CRESTOR) 40 MG tablet Take 1 tablet (40 mg) by mouth at bedtime 90 tablet 3       Allergies   Allergen Reactions    Cat Hair Extract Other (See Comments)     Reaction: Sneezing    Penicillins Hives          Lab Results    Chemistry/lipid CBC Cardiac Enzymes/BNP/TSH/INR   Recent Labs   Lab Test 01/16/23  0812   CHOL 134   HDL 38*   LDL 64   TRIG 159*     Recent Labs   Lab Test 01/16/23  0812 11/03/22  0906 11/15/21  1603   LDL 64 84 91     Recent Labs   Lab Test 05/05/21  1024      POTASSIUM 4.7   CHLORIDE 105   CO2 29      BUN 16   CR 0.96   GFRESTIMATED >60   ANITA 9.4     Recent Labs   Lab Test 05/05/21  1024 11/12/20  0846 08/17/20  0826   CR 0.96 1.00 0.92     No results for input(s): \"A1C\" in the last 61942 hours.       Recent Labs   Lab Test 08/07/20  0706   WBC 12.5*   HGB 14.8   HCT 43.4   MCV 90        Recent Labs   Lab Test 08/07/20  0706 08/06/20  0257 08/05/20  1240   HGB 14.8 14.9 16.0    Recent Labs   Lab Test 08/07/20  0706 08/06/20  0257 08/05/20  1537   TROPONINI 24.96* 59.22* 0.10     No results for input(s): \"BNP\", \"NTBNPI\", \"NTBNP\" in the last 01622 hours.  No results for input(s): \"TSH\" in the last 72000 " hours.  Recent Labs   Lab Test 08/05/20  1240   INR 0.93        Michelle Poe MD      Thank you for allowing me to participate in the care of your patient.      Sincerely,   Michelle Poe MD   [facility]  cc:   Michelle Poe MD  [address]  [place] [address]

## 2024-02-28 ENCOUNTER — TELEPHONE (OUTPATIENT)
Dept: CARDIOLOGY | Facility: CLINIC | Age: 54
End: 2024-02-28

## 2024-02-28 NOTE — TELEPHONE ENCOUNTER
[place] reviewed.  Patients recent blood pressure is outside of guideline parameters.  Called pt to review, no answer.  Left voicemail message asking patient to check their blood pressure using a home blood pressure cuff or by going to a [facility].  Patient instructed to then call [phone] ([facility]) and leave a message with their name, date of birth, and blood pressure reading that was completed within the last 24 hours and where it was completed.  Will await call back for further review.    CHRIS Fink

## 2024-05-17 NOTE — TELEPHONE ENCOUNTER
Patient returned call and left voicemail message with update blood pressure reading.      Last Blood Pressure: 142/92    Last Heart Rate: 67  Date: 01/12/24  Location: [facility]    Blood Pressure: 130s/90s  Location: Home BP    Patient reported blood pressure updated in Epic. Blood pressure continues to fall outside of the [place] Community Measures guidelines.  Message sent to primary cardiology team for further review.

## 2024-05-21 VITALS — SYSTOLIC BLOOD PRESSURE: 136 MMHG | DIASTOLIC BLOOD PRESSURE: 89 MMHG

## 2024-05-21 NOTE — TELEPHONE ENCOUNTER
Update noted - BP within acceptable parameters per [place] (<140/90) - no further actions required.  mg

## 2024-05-21 NOTE — TELEPHONE ENCOUNTER
Msg rec'd 5-21-24 @ 1037:  Adelita Orellana MA Gorshe, Maureen, RN  I spoke with pt and he gave a reading of 136/89.      ----- Message -----  From: Katharina Stubbs RN  Sent: 5/20/2024   7:22 AM CDT  To: Adelita Orellana MA  Subject: BP update                                        Please obtain a specific reading or readings w/ dates from patient since he has not been seen since 1-12-24.   Thanks  mg

## 2024-05-22 ENCOUNTER — LAB REQUISITION (OUTPATIENT)
Dept: LAB | Facility: CLINIC | Age: 54
End: 2024-05-22
Payer: COMMERCIAL

## 2024-05-22 DIAGNOSIS — R73.9 HYPERGLYCEMIA, UNSPECIFIED: ICD-10-CM

## 2024-05-22 DIAGNOSIS — I10 ESSENTIAL (PRIMARY) HYPERTENSION: ICD-10-CM

## 2024-05-22 LAB
HBA1C MFR BLD: 15.5 %
OSMOLALITY SERPL: 303 MMOL/KG (ref 275–295)

## 2024-05-22 PROCEDURE — 80053 COMPREHEN METABOLIC PANEL: CPT | Mod: ORL | Performed by: PHYSICIAN ASSISTANT

## 2024-05-22 PROCEDURE — 82043 UR ALBUMIN QUANTITATIVE: CPT | Mod: ORL | Performed by: PHYSICIAN ASSISTANT

## 2024-05-22 PROCEDURE — 83036 HEMOGLOBIN GLYCOSYLATED A1C: CPT | Mod: ORL | Performed by: PHYSICIAN ASSISTANT

## 2024-05-22 PROCEDURE — 83930 ASSAY OF BLOOD OSMOLALITY: CPT | Mod: ORL | Performed by: PHYSICIAN ASSISTANT

## 2024-05-23 LAB
ALBUMIN SERPL BCG-MCNC: 4.3 G/DL (ref 3.5–5.2)
ALP SERPL-CCNC: 122 U/L (ref 40–150)
ALT SERPL W P-5'-P-CCNC: 43 U/L (ref 0–70)
ANION GAP SERPL CALCULATED.3IONS-SCNC: 12 MMOL/L (ref 7–15)
AST SERPL W P-5'-P-CCNC: 24 U/L (ref 0–45)
BILIRUB SERPL-MCNC: 0.4 MG/DL
BUN SERPL-MCNC: 13.7 MG/DL (ref 6–20)
CALCIUM SERPL-MCNC: 9.5 MG/DL (ref 8.6–10)
CHLORIDE SERPL-SCNC: 94 MMOL/L (ref 98–107)
CREAT SERPL-MCNC: 0.99 MG/DL (ref 0.67–1.17)
CREAT UR-MCNC: 122 MG/DL
DEPRECATED HCO3 PLAS-SCNC: 25 MMOL/L (ref 22–29)
EGFRCR SERPLBLD CKD-EPI 2021: 90 ML/MIN/1.73M2
GLUCOSE SERPL-MCNC: 440 MG/DL (ref 70–99)
MICROALBUMIN UR-MCNC: 112 MG/L
MICROALBUMIN/CREAT UR: 91.8 MG/G CR (ref 0–17)
POTASSIUM SERPL-SCNC: 5 MMOL/L (ref 3.4–5.3)
PROT SERPL-MCNC: 7.3 G/DL (ref 6.4–8.3)
SODIUM SERPL-SCNC: 131 MMOL/L (ref 135–145)

## 2024-06-10 ENCOUNTER — LAB REQUISITION (OUTPATIENT)
Dept: LAB | Facility: CLINIC | Age: 54
End: 2024-06-10
Payer: COMMERCIAL

## 2024-06-10 DIAGNOSIS — E78.2 MIXED HYPERLIPIDEMIA: ICD-10-CM

## 2024-06-10 DIAGNOSIS — E11.65 TYPE 2 DIABETES MELLITUS WITH HYPERGLYCEMIA (H): ICD-10-CM

## 2024-06-10 PROCEDURE — 82043 UR ALBUMIN QUANTITATIVE: CPT | Mod: ORL | Performed by: PHYSICIAN ASSISTANT

## 2024-06-10 PROCEDURE — 80048 BASIC METABOLIC PNL TOTAL CA: CPT | Mod: ORL | Performed by: PHYSICIAN ASSISTANT

## 2024-06-10 PROCEDURE — 80061 LIPID PANEL: CPT | Mod: ORL | Performed by: PHYSICIAN ASSISTANT

## 2024-06-11 LAB
ANION GAP SERPL CALCULATED.3IONS-SCNC: 12 MMOL/L (ref 7–15)
BUN SERPL-MCNC: 19 MG/DL (ref 6–20)
CALCIUM SERPL-MCNC: 9.6 MG/DL (ref 8.6–10)
CHLORIDE SERPL-SCNC: 102 MMOL/L (ref 98–107)
CHOLEST SERPL-MCNC: 90 MG/DL
CREAT SERPL-MCNC: 0.93 MG/DL (ref 0.67–1.17)
CREAT UR-MCNC: 31.5 MG/DL
DEPRECATED HCO3 PLAS-SCNC: 23 MMOL/L (ref 22–29)
EGFRCR SERPLBLD CKD-EPI 2021: >90 ML/MIN/1.73M2
FASTING STATUS PATIENT QL REPORTED: ABNORMAL
FASTING STATUS PATIENT QL REPORTED: NORMAL
GLUCOSE SERPL-MCNC: 70 MG/DL (ref 70–99)
HDLC SERPL-MCNC: 33 MG/DL
LDLC SERPL CALC-MCNC: 33 MG/DL
MICROALBUMIN UR-MCNC: <12 MG/L
MICROALBUMIN/CREAT UR: NORMAL MG/G{CREAT}
NONHDLC SERPL-MCNC: 57 MG/DL
POTASSIUM SERPL-SCNC: 4.3 MMOL/L (ref 3.4–5.3)
SODIUM SERPL-SCNC: 137 MMOL/L (ref 135–145)
TRIGL SERPL-MCNC: 121 MG/DL

## 2024-06-18 NOTE — PROGRESS NOTES
Medication Therapy Management (MTM) Encounter    ASSESSMENT:                            Medication Adherence/Access: No issues identified    Diabetes: Patient is not meeting A1c goal of < 7%. Patient would benefit from starting Mounjaro, lifestyle changes, updated eye exam, and starting aspirin 81mg daily. Patient's meter appears to be defective we will need to contact the  for a replacement.     Hypertension: Stable.    Hyperlipidemia: Stable.    PLAN:                            Restart aspirin 81 mg tablet by mouth daily.   Your PA for Mounjaro was approved yesterday. You will be able to pick this up at the pharmacy and begin taking it as soon as possible.   Contact the [employer] customer support to have them assist with replacing your meter.     Follow-up: Return in about 4 weeks (around 7/18/2024), or if symptoms worsen or fail to improve, for Follow up.    SUBJECTIVE/OBJECTIVE:                          Fredrick Lockhart is a 53 year old male seen for an initial visit. He was referred to me from Dangelo Singh.      Reason for visit: Full medication review.    Allergies/ADRs: Reviewed in chart  Past Medical History: Reviewed in chart  Tobacco: He reports that he quit smoking about 3 years ago. His smoking use included cigarettes. He started smoking about 23 years ago. He has a 20 pack-year smoking history. He has never used smokeless tobacco.  Alcohol: none    Medication Adherence/Access: no issues reported        Diabetes   Patient is in clinic today to discuss diabetes. Recent diagnosis, and his blood sugars have been trending down nicely. Denies episodes of low sugars, but does feel when they are high. We went over how Mounjaro works, how to administer and side effects. Patient denied additional questions.     Mounjaro 2.5 mg subcutaneous weekly injection  Metformin  mg tablet by mouth - two tablets twice daily  Glipizide ER 10 mg tablet by mouth daily with breakfast     Patient is not experiencing  side effects.  Current diabetes symptoms: none  Blood sugar monitoring: meter is not working in clinic (previously had been reading in the 180s mg/dl)  Diet/Exercise: Patient cut out all soda and is drinking lots of water, continues protein heavy diet.      Eye exam in the last 12 months? No  Foot exam: due    Hypertension   Metoprolol Succinate 50 mg tablet by mouth daily   Patient reports no current medication side effects  Patient does not self-monitor blood pressure.       Hyperlipidemia   Rosuvastatin 40 mg daily  Ezetimibe (Zetia) 10 mg once daily  Patient reports myalgias since on medication.           Today's Vitals: /78 (BP Location: Left arm)   Wt 278 lb (126.1 kg)   BMI 39.89 kg/m    ----------------      I spent 30 minutes with this patient today. All changes were made via collaborative practice agreement with Dangelo Singh PA-C. A copy of the visit note was provided to the patient's provider(s).    A summary of these recommendations was sent via [employer].    Brittny Aceves, PharmD  Medication Therapy Management Pharmacist       Medication Therapy Recommendations  Type 2 diabetes mellitus without complication, without long-term current use of insulin (H)    Current Medication: tirzepatide (MOUNJARO) 2.5 MG/0.5ML pen   Rationale: Does not understand instructions - Adherence - Adherence   Recommendation: Provide Education   Status: Patient Agreed - Adherence/Education          Rationale: Preventive therapy - Needs additional medication therapy - Indication   Recommendation: Start Medication   Status: Accepted per CPA   Note: Aspirin

## 2024-06-20 ENCOUNTER — OFFICE VISIT (OUTPATIENT)
Dept: PHARMACY | Facility: PHYSICIAN GROUP | Age: 54
End: 2024-06-20

## 2024-06-20 VITALS — DIASTOLIC BLOOD PRESSURE: 78 MMHG | BODY MASS INDEX: 39.89 KG/M2 | WEIGHT: 278 LBS | SYSTOLIC BLOOD PRESSURE: 130 MMHG

## 2024-06-20 DIAGNOSIS — E78.2 MIXED HYPERLIPIDEMIA: ICD-10-CM

## 2024-06-20 DIAGNOSIS — E11.9 TYPE 2 DIABETES MELLITUS WITHOUT COMPLICATION, WITHOUT LONG-TERM CURRENT USE OF INSULIN (H): Primary | ICD-10-CM

## 2024-06-20 DIAGNOSIS — I10 BENIGN ESSENTIAL HYPERTENSION: ICD-10-CM

## 2024-06-20 PROCEDURE — 99607 MTMS BY PHARM ADDL 15 MIN: CPT | Performed by: PHARMACIST

## 2024-06-20 PROCEDURE — 99605 MTMS BY PHARM NP 15 MIN: CPT | Performed by: PHARMACIST

## 2024-06-20 RX ORDER — TIRZEPATIDE 2.5 MG/.5ML
2.5 INJECTION, SOLUTION SUBCUTANEOUS WEEKLY
COMMUNITY

## 2024-06-20 RX ORDER — GLIPIZIDE 10 MG/1
10 TABLET, FILM COATED, EXTENDED RELEASE ORAL DAILY
COMMUNITY
Start: 2024-06-19

## 2024-06-20 RX ORDER — TIRZEPATIDE 2.5 MG/.5ML
2.5 INJECTION, SOLUTION SUBCUTANEOUS
Qty: 2 ML | Refills: 0 | Status: CANCELLED | OUTPATIENT
Start: 2024-06-20

## 2024-06-20 RX ORDER — METFORMIN HCL 500 MG
1000 TABLET, EXTENDED RELEASE 24 HR ORAL 2 TIMES DAILY WITH MEALS
COMMUNITY
Start: 2024-06-19

## 2024-06-20 NOTE — PATIENT INSTRUCTIONS
"Recommendations from today's MTM visit:                                                    MTM (medication therapy management) is a service provided by a clinical pharmacist designed to help you get the most of out of your medicines.   Today we reviewed what your medicines are for, how to know if they are working, that your medicines are safe and how to make your medicine regimen as easy as possible.      Restart aspirin 81 mg tablet by mouth daily   Your PA for Mounjaro was approved yesterday. You will be able to pick this up at the pharmacy and begin taking it as soon as possible.   Contact the [employer] customer support to have them assist with replacing your meter.     Follow-up: Return in about 4 weeks (around 7/18/2024), or if symptoms worsen or fail to improve, for Follow up.    It was great speaking with you today.  I value your experience and would be very thankful for your time in providing feedback in our clinic survey. In the next few days, you may receive an email or text message from [employer] with a link to a survey related to your  clinical pharmacist.\"     To schedule another MTM appointment, please call the clinic directly or you may call the MTM scheduling line at [phone].    My Clinical Pharmacist's contact information:                                                      Please feel free to contact me with any questions or concerns you have.      Brittny Aceves, PharmD  Medication Therapy Management Pharmacist    "

## 2024-08-25 ENCOUNTER — HEALTH MAINTENANCE LETTER (OUTPATIENT)
Age: 54
End: 2024-08-25

## 2024-09-11 ENCOUNTER — TRANSFERRED RECORDS (OUTPATIENT)
Dept: HEALTH INFORMATION MANAGEMENT | Facility: CLINIC | Age: 54
End: 2024-09-11
Payer: COMMERCIAL

## 2024-09-11 LAB — HBA1C MFR BLD: 6 % (ref 4.2–6.1)

## 2024-11-03 ENCOUNTER — HEALTH MAINTENANCE LETTER (OUTPATIENT)
Age: 54
End: 2024-11-03

## 2024-12-29 ENCOUNTER — HEALTH MAINTENANCE LETTER (OUTPATIENT)
Age: 54
End: 2024-12-29

## 2025-01-03 ENCOUNTER — TRANSFERRED RECORDS (OUTPATIENT)
Dept: HEALTH INFORMATION MANAGEMENT | Facility: CLINIC | Age: 55
End: 2025-01-03

## 2025-01-15 DIAGNOSIS — I21.11 ST ELEVATION MYOCARDIAL INFARCTION INVOLVING RIGHT CORONARY ARTERY (H): ICD-10-CM

## 2025-01-15 DIAGNOSIS — I25.10 CORONARY ARTERY DISEASE INVOLVING NATIVE CORONARY ARTERY OF NATIVE HEART WITHOUT ANGINA PECTORIS: ICD-10-CM

## 2025-01-15 DIAGNOSIS — E78.5 DYSLIPIDEMIA: ICD-10-CM

## 2025-01-16 RX ORDER — EZETIMIBE 10 MG/1
10 TABLET ORAL DAILY
Qty: 90 TABLET | Refills: 0 | Status: SHIPPED | OUTPATIENT
Start: 2025-01-16

## 2025-01-16 RX ORDER — METOPROLOL SUCCINATE 50 MG/1
50 TABLET, EXTENDED RELEASE ORAL DAILY
Qty: 90 TABLET | Refills: 0 | Status: SHIPPED | OUTPATIENT
Start: 2025-01-16

## 2025-01-16 RX ORDER — ROSUVASTATIN CALCIUM 40 MG/1
40 TABLET, COATED ORAL AT BEDTIME
Qty: 90 TABLET | Refills: 0 | Status: SHIPPED | OUTPATIENT
Start: 2025-01-16

## 2025-02-12 ENCOUNTER — OFFICE VISIT (OUTPATIENT)
Dept: CARDIOLOGY | Facility: CLINIC | Age: 55
End: 2025-02-12
Payer: COMMERCIAL

## 2025-02-12 VITALS
RESPIRATION RATE: 16 BRPM | BODY MASS INDEX: 30.99 KG/M2 | SYSTOLIC BLOOD PRESSURE: 112 MMHG | WEIGHT: 216 LBS | HEART RATE: 70 BPM | DIASTOLIC BLOOD PRESSURE: 72 MMHG

## 2025-02-12 DIAGNOSIS — I25.10 CORONARY ARTERY DISEASE INVOLVING NATIVE CORONARY ARTERY OF NATIVE HEART WITHOUT ANGINA PECTORIS: Primary | ICD-10-CM

## 2025-02-12 PROCEDURE — 99214 OFFICE O/P EST MOD 30 MIN: CPT | Performed by: INTERNAL MEDICINE

## 2025-02-12 PROCEDURE — G2211 COMPLEX E/M VISIT ADD ON: HCPCS | Performed by: INTERNAL MEDICINE

## 2025-02-12 NOTE — PROGRESS NOTES
HEART CARE ENCOUNTER CONSULTATON NOTE      [facility]  [phone]      Assessment/Recommendations   Assessment:    1.  Coronary artery disease: Status post inferior myocardial infarction status post PCI of RCA on 8/5/2020  2.  Hyperlipidemia: Very well-controlled on high-dose Crestor and Zetia with an LDL less than 55  3.  Heavy smoking quit on 8/5/2020  4.  Severe EBONY on CPAP  5.  Diabetes mellitus type 2 well-controlled     Plan:  1.  Continue Crestor 40 mg daily and zetia  2.  Will consider starting Jardiance.  Can discuss replacing metformin with Jardiance with his primary.  3.  Continue aspirin and Toprol-XL 50 mg daily  4.  Continue diet and exercise  5. Continue CPAP for EBONY  Follow-up in 1 year         History of Present Illness/Subjective    HPI: Fredrick Lockhart is a 54 year old male with history of coronary artery disease status post inferior ST elevation myocardial infarction status post PCI to RCA in 2020, heavy smoking, hyperlipidemia, hypertension, EBONY on CPAP who I am seeing today in follow-up.  He works in [email or web address] and lives in [place].  He has remained quite active.  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  He is also quite active at work and sometimes does some push-ups.  He has been able to lose quite a bit of weight.  At his highest he was 270 pounds in Christmas of 2023 and is now down to about 215.  He has done this through diet, exercise as well as Mounjaro.  He has cut out pop as well.  He was diagnosed with diabetes mellitus type 2 which prompted him to get more motivated about losing weight.  Initially A1c was over 15% and now most recent A1c 6%.  He is taking metformin only as needed.  He denies any chest pain or breathing difficulty.    The longitudinal plan of care for the diagnosis(es)/condition(s) as documented were addressed during this visit. Due to the added complexity in care, I will continue to support Fredrick  in the subsequent management and with ongoing continuity of care.      Physical Examination  Review of Systems   Vitals: /72 (BP Location: Right arm, Patient Position: Sitting, Cuff Size: Adult Regular)   Pulse 70   Resp 16   Wt 98 kg (216 lb)   BMI 30.99 kg/m    BMI= Body mass index is 30.99 kg/m .  Wt Readings from Last 3 Encounters:   02/12/25 98 kg (216 lb)   06/20/24 126.1 kg (278 lb)   01/12/24 116.6 kg (257 lb)       General Appearance:   no distress, normal body habitus   ENT/Mouth: membranes moist, no oral lesions or bleeding gums.      EYES:  no scleral icterus, normal conjunctivae   Neck: no carotid bruits or thyromegaly   Chest/Lungs:   lungs are clear to auscultation   Cardiovascular:   Regular. Normal first and second heart sounds with no murmur  no edema bilaterally    Abdomen:  bowel sounds are present   Extremities: no cyanosis or clubbing   Skin: no xanthelasma, warm.    Neurologic: normal  bilateral, no tremors     Psychiatric: alert and oriented x3, calm        Please refer above for cardiac ROS details.        Medical History  Surgical History Family History Social History   Past Medical History:   Diagnosis Date    Bilateral carpal tunnel syndrome      Past Surgical History:   Procedure Laterality Date    CV CORONARY ANGIOGRAM N/A 8/5/2020    Procedure: Coronary Angiogram;  Surgeon: Norma Betancourt MD;  Location: [facility];  Service: Cardiology    CV LEFT HEART CATHETERIZATION WITHOUT LEFT VENTRICULOGRAM Left 8/5/2020    Procedure: Left Heart Catheterization Without Left Ventriculogram;  Surgeon: Norma Betancourt MD;  Location: [facility];  Service: Cardiology    CO HOME SLEEP TEST/TYPE 3 VIVIAN  1/19/2023          Family History   Problem Relation Age of Onset    Snoring Father         Social History     Socioeconomic History    Marital status:      Spouse name: Not on file    Number of children: Not on file    Years of education: Not on file     Highest education level: Not on file   Occupational History    Not on file   Tobacco Use    Smoking status: Former     Current packs/day: 0.00     Average packs/day: 1 pack/day for 20.0 years (20.0 ttl pk-yrs)     Types: Cigarettes     Start date: 2000     Quit date: 2020     Years since quittin.5    Smokeless tobacco: Never   Vaping Use    Vaping status: Never Used   Substance and Sexual Activity    Alcohol use: Not Currently    Drug use: Never    Sexual activity: Not on file   Other Topics Concern    Not on file   Social History Narrative    Not on file     Social Drivers of Health     Financial Resource Strain: Not on file   Food Insecurity: Not on file   Transportation Needs: Not on file   Physical Activity: Not on file   Stress: Not on file   Social Connections: Not on file   Interpersonal Safety: Not on file   Housing Stability: Not on file           Medications  Allergies   Current Outpatient Medications   Medication Sig Dispense Refill    aspirin 81 mg chewable tablet [ASPIRIN 81 MG CHEWABLE TABLET] Chew 1 tablet (81 mg total) daily. Take aspirin indefinitely (for the rest of your life). 30 tablet 11    ezetimibe (ZETIA) 10 MG tablet TAKE 1 TABLET(10 MG) BY MOUTH DAILY 90 tablet 0    metFORMIN (GLUCOPHAGE XR) 500 MG 24 hr tablet Take 1,000 mg by mouth 2 times daily as needed.      metoprolol succinate ER (TOPROL XL) 50 MG 24 hr tablet TAKE 1 TABLET(50 MG) BY MOUTH DAILY 90 tablet 0    rosuvastatin (CRESTOR) 40 MG tablet TAKE 1 TABLET(40 MG) BY MOUTH AT BEDTIME 90 tablet 0    tirzepatide (MOUNJARO) 2.5 MG/0.5ML pen Inject 2.5 mg Subcutaneous once a week         Allergies   Allergen Reactions    Cat Dander Other (See Comments)     Reaction: Sneezing    Penicillins Hives          Lab Results    Chemistry/lipid CBC Cardiac Enzymes/BNP/TSH/INR   Recent Labs   Lab Test 06/10/24  1610   CHOL 90   HDL 33*   LDL 33   TRIG 121     Recent Labs   Lab Test 06/10/24  1610 23  0812 22  0906   LDL 33  "64 84     Recent Labs   Lab Test 06/10/24  1610      POTASSIUM 4.3   CHLORIDE 102   CO2 23   GLC 70   BUN 19.0   CR 0.93   GFRESTIMATED >90   ANITA 9.6     Recent Labs   Lab Test 06/10/24  1610 05/22/24  0802 05/05/21  1024   CR 0.93 0.99 0.96     Recent Labs   Lab Test 05/22/24  0852   A1C 15.5*          Recent Labs   Lab Test 08/07/20  0706   WBC 12.5*   HGB 14.8   HCT 43.4   MCV 90        Recent Labs   Lab Test 08/07/20  0706 08/06/20  0257 08/05/20  1240   HGB 14.8 14.9 16.0    Recent Labs   Lab Test 08/07/20  0706 08/06/20  0257 08/05/20  1537   TROPONINI 24.96* 59.22* 0.10     No results for input(s): \"BNP\", \"NTBNPI\", \"NTBNP\" in the last 01453 hours.  No results for input(s): \"TSH\" in the last 72251 hours.  Recent Labs   Lab Test 08/05/20  1240   INR 0.93        Michelle Poe MD                                      "

## 2025-02-12 NOTE — LETTER
2/12/2025    Dangelo Singh PA-C  [address] [place] [place] [address]    RE: Fredrick Lockhart       Dear Colleague,     I had the pleasure of seeing Fredrick Lockhart in the [facility].    HEART CARE ENCOUNTER CONSULTATON NOTE      [facility]  [phone]      Assessment/Recommendations   Assessment:    1.  Coronary artery disease: Status post inferior myocardial infarction status post PCI of RCA on 8/5/2020  2.  Hyperlipidemia: Very well-controlled on high-dose Crestor and Zetia with an LDL less than 55  3.  Heavy smoking quit on 8/5/2020  4.  Severe EBONY on CPAP  5.  Diabetes mellitus type 2 well-controlled     Plan:  1.  Continue Crestor 40 mg daily and zetia  2.  Will consider starting Jardiance.  Can discuss replacing metformin with Jardiance with his primary.  3.  Continue aspirin and Toprol-XL 50 mg daily  4.  Continue diet and exercise  5. Continue CPAP for EBONY  Follow-up in 1 year         History of Present Illness/Subjective    HPI: Fredrick Lockhart is a 54 year old male with history of coronary artery disease status post inferior ST elevation myocardial infarction status post PCI to RCA in 2020, heavy smoking, hyperlipidemia, hypertension, EBONY on CPAP who I am seeing today in follow-up.  He works in [employer] and lives in [place].  He has remained quite active.  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  He is also quite active at work and sometimes does some push-ups.  He has been able to lose quite a bit of weight.  At his highest he was 270 pounds in [place] of 2023 and is now down to about 215.  He has done this through diet, exercise as well as Mounjaro.  He has cut out pop as well.  He was diagnosed with diabetes mellitus type 2 which prompted him to get more motivated about losing weight.  Initially A1c was over 15% and now most recent A1c 6%.  He is taking metformin only as needed.  He denies any chest pain or breathing  difficulty.    The longitudinal plan of care for the diagnosis(es)/condition(s) as documented were addressed during this visit. Due to the added complexity in care, I will continue to support Fredrick in the subsequent management and with ongoing continuity of care.      Physical Examination  Review of Systems   Vitals: /72 (BP Location: Right arm, Patient Position: Sitting, Cuff Size: Adult Regular)   Pulse 70   Resp 16   Wt 98 kg (216 lb)   BMI 30.99 kg/m    BMI= Body mass index is 30.99 kg/m .  Wt Readings from Last 3 Encounters:   02/12/25 98 kg (216 lb)   06/20/24 126.1 kg (278 lb)   01/12/24 116.6 kg (257 lb)       General Appearance:   no distress, normal body habitus   ENT/Mouth: membranes moist, no oral lesions or bleeding gums.      EYES:  no scleral icterus, normal conjunctivae   Neck: no carotid bruits or thyromegaly   Chest/Lungs:   lungs are clear to auscultation   Cardiovascular:   Regular. Normal first and second heart sounds with no murmur  no edema bilaterally    Abdomen:  bowel sounds are present   Extremities: no cyanosis or clubbing   Skin: no xanthelasma, warm.    Neurologic: normal  bilateral, no tremors     Psychiatric: alert and oriented x3, calm        Please refer above for cardiac ROS details.        Medical History  Surgical History Family History Social History   Past Medical History:   Diagnosis Date     Bilateral carpal tunnel syndrome      Past Surgical History:   Procedure Laterality Date     CV CORONARY ANGIOGRAM N/A 8/5/2020    Procedure: Coronary Angiogram;  Surgeon: Norma Betancourt MD;  Location: [facility];  Service: Cardiology     CV LEFT HEART CATHETERIZATION WITHOUT LEFT VENTRICULOGRAM Left 8/5/2020    Procedure: Left Heart Catheterization Without Left Ventriculogram;  Surgeon: Norma Betancourt MD;  Location: [facility];  Service: Cardiology     VT HOME SLEEP TEST/TYPE 3 VIVIAN  1/19/2023          Family History   Problem Relation Age of Onset      Snoring Father         Social History     Socioeconomic History     Marital status:      Spouse name: Not on file     Number of children: Not on file     Years of education: Not on file     Highest education level: Not on file   Occupational History     Not on file   Tobacco Use     Smoking status: Former     Current packs/day: 0.00     Average packs/day: 1 pack/day for 20.0 years (20.0 ttl pk-yrs)     Types: Cigarettes     Start date: 2000     Quit date: 2020     Years since quittin.5     Smokeless tobacco: Never   Vaping Use     Vaping status: Never Used   Substance and Sexual Activity     Alcohol use: Not Currently     Drug use: Never     Sexual activity: Not on file   Other Topics Concern     Not on file   Social History Narrative     Not on file     Social Drivers of Health     Financial Resource Strain: Not on file   Food Insecurity: Not on file   Transportation Needs: Not on file   Physical Activity: Not on file   Stress: Not on file   Social Connections: Not on file   Interpersonal Safety: Not on file   Housing Stability: Not on file           Medications  Allergies   Current Outpatient Medications   Medication Sig Dispense Refill     aspirin 81 mg chewable tablet [ASPIRIN 81 MG CHEWABLE TABLET] Chew 1 tablet (81 mg total) daily. Take aspirin indefinitely (for the rest of your life). 30 tablet 11     ezetimibe (ZETIA) 10 MG tablet TAKE 1 TABLET(10 MG) BY MOUTH DAILY 90 tablet 0     metFORMIN (GLUCOPHAGE XR) 500 MG 24 hr tablet Take 1,000 mg by mouth 2 times daily as needed.       metoprolol succinate ER (TOPROL XL) 50 MG 24 hr tablet TAKE 1 TABLET(50 MG) BY MOUTH DAILY 90 tablet 0     rosuvastatin (CRESTOR) 40 MG tablet TAKE 1 TABLET(40 MG) BY MOUTH AT BEDTIME 90 tablet 0     tirzepatide (MOUNJARO) 2.5 MG/0.5ML pen Inject 2.5 mg Subcutaneous once a week         Allergies   Allergen Reactions     Cat Dander Other (See Comments)     Reaction: Sneezing     Penicillins Hives          Lab  "Results    Chemistry/lipid CBC Cardiac Enzymes/BNP/TSH/INR   Recent Labs   Lab Test 06/10/24  1610   CHOL 90   HDL 33*   LDL 33   TRIG 121     Recent Labs   Lab Test 06/10/24  1610 01/16/23  0812 11/03/22  0906   LDL 33 64 84     Recent Labs   Lab Test 06/10/24  1610      POTASSIUM 4.3   CHLORIDE 102   CO2 23   GLC 70   BUN 19.0   CR 0.93   GFRESTIMATED >90   ANITA 9.6     Recent Labs   Lab Test 06/10/24  1610 05/22/24  0802 05/05/21  1024   CR 0.93 0.99 0.96     Recent Labs   Lab Test 05/22/24  0852   A1C 15.5*          Recent Labs   Lab Test 08/07/20  0706   WBC 12.5*   HGB 14.8   HCT 43.4   MCV 90        Recent Labs   Lab Test 08/07/20  0706 08/06/20  0257 08/05/20  1240   HGB 14.8 14.9 16.0    Recent Labs   Lab Test 08/07/20  0706 08/06/20  0257 08/05/20  1537   TROPONINI 24.96* 59.22* 0.10     No results for input(s): \"BNP\", \"NTBNPI\", \"NTBNP\" in the last 35541 hours.  No results for input(s): \"TSH\" in the last 39816 hours.  Recent Labs   Lab Test 08/05/20  1240   INR 0.93        Michelle Poe MD                                        Thank you for allowing me to participate in the care of your patient.      Sincerely,     Michelle Poe MD     [facility]  cc:   Michelle Poe MD  [address]  [address]  [place] [address]      "

## 2025-04-09 DIAGNOSIS — E78.5 DYSLIPIDEMIA: ICD-10-CM

## 2025-04-09 DIAGNOSIS — I21.11 ST ELEVATION MYOCARDIAL INFARCTION INVOLVING RIGHT CORONARY ARTERY (H): ICD-10-CM

## 2025-04-10 RX ORDER — METOPROLOL SUCCINATE 50 MG/1
50 TABLET, EXTENDED RELEASE ORAL DAILY
Qty: 90 TABLET | Refills: 2 | Status: SHIPPED | OUTPATIENT
Start: 2025-04-10

## 2025-04-10 RX ORDER — ROSUVASTATIN CALCIUM 40 MG/1
40 TABLET, COATED ORAL AT BEDTIME
Qty: 90 TABLET | Refills: 2 | Status: SHIPPED | OUTPATIENT
Start: 2025-04-10

## 2025-04-15 DIAGNOSIS — I25.10 CORONARY ARTERY DISEASE INVOLVING NATIVE CORONARY ARTERY OF NATIVE HEART WITHOUT ANGINA PECTORIS: ICD-10-CM

## 2025-04-15 DIAGNOSIS — I21.11 ST ELEVATION MYOCARDIAL INFARCTION INVOLVING RIGHT CORONARY ARTERY (H): ICD-10-CM

## 2025-04-15 DIAGNOSIS — E78.5 DYSLIPIDEMIA: ICD-10-CM

## 2025-04-15 NOTE — PROGRESS NOTES
ITP ASSESSMENT   Assessment Day: 60 Day    Session Number: 20  Precautions: S/P MI    Diagnosis: MI;Stent    Risk Stratification: High    Referring Provider: Vipul López MD   ITP: Dr. Rascon  EXERCISE  Exercise Assessment: Discharge       6 Minute Walk Test   Pre   Pre Exercise HR: 62                    Pre Exercise BP: 146/8      Peak  Peak HR: 83                   Peak BP: 149/89    Peak feet: 1550    Peak O2 SAT: 98    Peak RPE: 4    Peak MPH: 2.94      Symptoms:  Peak Symptoms: None      5 mins. Post  5 Min Post HR: 64    5 Min Post BP: 148/84                           Exercise Plan    Education Goals: All goals in this section met    Education Goals Met: Patient can state cardiac s/s and appropriate emergency response.;Has system for taking medication.;Medication review.                          Goals Met  30 day ADL'S goals met: Pt is tolerating 5.2 mets for 30-40 minutes without CV symptoms or EKG changes.    30 day Leisure goals met: Goal Met - Pt is working full time and tolerating all activities.    30 day Work goals met: Goal Not Met - Pt has not gone hiking yet.    30 Day Progression: Pt is progressing towards his goals. Pt has resumed full time work and plans to continue exercing at home with home walking.      Initial ADL's goals met: Met. Pt is exercising at 3.5-3.8 met level for 40 minutes    Intial Work goals met: Pt has returned to work    Initial Progression: doing well with risk factor modification and home exercise.       Exercise Prescription  Exercise Mode: Treadmill;Bike;Nustep;Arm Erg.;Hallway Walking    Frequency: 2x/week    Duration: 40 min    Intensity / THR: 20-30 beats above resting heart rate (102-136)    RPE 11-14  Progression / Met level: 3.5-5    Resistive Training?: Yes      Current Exercise (mins/week): 220      Interventions  Home Exercise:  Mode: Walk    Frequency: 4-6x/week    Duration: 30-40 Minutes      Education Material : Educational videos;Provide written  Reason for call:   Notes: Patient just scheduled monitoring and would like to know transfer date     "material;Individual education and counseling;Offer educational classes      Education Completed  Exercise Education Completed: Cardiac Anatomy;Signs and Symptoms;Medication review;RPE;Emergency Plan;Home Exercise;Warm up/cool down;FITT Principles;BP/HR Reponse to exercise;Benefits of Exercise;End point of exercise              Exercise Follow-up/Discharge  Follow up/Discharge: Skilled therapy needed for pt S/P MI to monitor for CV changes with increases in exercise intensity, to educate and reinforce risk factor education. Pt will benefit from support from staff to remain tobacco free, quit with event   NUTRITION  Nutrition Assessment: Discharge      Nutrition Risk Factors:  Nutrition Risk Factors: Dyslipidemia;Overweight  Cholesterol: 309 (8/6/20)  LDL: 201  HDL: 31  Triglycerides: 238      Nutrition Plan  Interventions  Diet Consult: Completed    Other Nutrition Intervention: Therapist/Pt Discussion    Follow-Up Rate Your Plate Score: 63      Education Completed  Nutrition Education Completed: Low Saturated fat diet;Low sodium diet      Goals  Nutrition Goals (Next 30 days): Patient will follow a low sodium diet;Patient will follow a low saturated fat diet      Goals Met  Nutrition Goals Met: Patient knows appropriate portion size      Height, Weight, and  BMI  Weight: 210 lb (95.3 kg)  Height: 5' 10\" (1.778 m)  BMI: 30.13      Nutrition Follow-up  Follow-up/Discharge: RD available for questions as needed         Other Risk Factors  Other Risk Factor Assessment: Discharge      HTN Risk Factor: Hypertension      Pre Exercise BP: 146/86  Post Exercise BP: 104/70      Hypertension Plan  Goals  HTN Goals: Exercises regularly      Goals Met  HTN Goals Met: Take medication as prescribed;Follow low sodium diet      HTN Interventions  HTN Interventions: Diet consult;Therapist/patient discussion;Provide written material;Offer educational videos;Offer educational classes      HTN Education Completed  HTN Education " Completed: Low sodium diet;Medication review;Risk factor overview      Tobacco Risk Factor: Tobacco      Initial Use:: <1 ppd  Current Use:: None      Tobacco Plan  Tobacco Goals  Tobacco Goals: Patient remain tobacco free      Goals Met  Tobacco Goals Met: Patient remain tobacco free      Tobacco Interventions  Tobacco Interventions: Therapist/patient discussion;Provide written material;Offer educational videos;Offer class on risk factor modification      Tobacco Education Completed  Tobacco Education Completed: Identifies triggers;Health benefits of tobacco cessation;Risk factor overview      Risk Factor Follow-up   Follow-up/Discharge: quit on 8/5/2020     PSYCHOSOCIAL  Psychosocial Assessment: Discharge       [facility] Q of L Summary Score: 14      PHQ-9 Total Score: 1      Psychosocial Risk Factor: Stress      Psychosocial Plan  Interventions  Interventions: Offer Spiritual Care consult;Offer educational videos and classes;Provide written material;Individual education and counseling      Education Completed  Education Completed: Relaxation/Coping Techniques;Effects of stress on body      Goals  Goals (Next 30 days): Practicing stress management skills      Goals Met  Goals Met: Identified Support system;Oriented to stress management classes;Identify stressors      Psychosocial Follow-up  Follow-up/Discharge: Has some work stress and is managing. He belongs to [employer] [facility], good support through meetings and books             Patient involved in Goal setting?: Yes      Signature: _____________________________________________________________    Date: __________________    Time: __________________

## 2025-04-16 RX ORDER — EZETIMIBE 10 MG/1
10 TABLET ORAL DAILY
Qty: 90 TABLET | Refills: 3 | Status: SHIPPED | OUTPATIENT
Start: 2025-04-16

## 2025-04-19 ENCOUNTER — HEALTH MAINTENANCE LETTER (OUTPATIENT)
Age: 55
End: 2025-04-19

## 2025-06-11 ENCOUNTER — LAB REQUISITION (OUTPATIENT)
Dept: LAB | Facility: CLINIC | Age: 55
End: 2025-06-11
Payer: COMMERCIAL

## 2025-06-11 DIAGNOSIS — Z12.5 ENCOUNTER FOR SCREENING FOR MALIGNANT NEOPLASM OF PROSTATE: ICD-10-CM

## 2025-06-11 DIAGNOSIS — I10 ESSENTIAL (PRIMARY) HYPERTENSION: ICD-10-CM

## 2025-06-11 DIAGNOSIS — E78.5 HYPERLIPIDEMIA, UNSPECIFIED: ICD-10-CM

## 2025-06-11 DIAGNOSIS — E11.9 TYPE 2 DIABETES MELLITUS WITHOUT COMPLICATIONS (H): ICD-10-CM

## 2025-06-11 PROCEDURE — 80053 COMPREHEN METABOLIC PANEL: CPT | Mod: ORL | Performed by: PHYSICIAN ASSISTANT

## 2025-06-11 PROCEDURE — G0103 PSA SCREENING: HCPCS | Mod: ORL | Performed by: PHYSICIAN ASSISTANT

## 2025-06-11 PROCEDURE — 82570 ASSAY OF URINE CREATININE: CPT | Mod: ORL | Performed by: PHYSICIAN ASSISTANT

## 2025-06-11 PROCEDURE — 80061 LIPID PANEL: CPT | Mod: ORL | Performed by: PHYSICIAN ASSISTANT

## 2025-06-12 LAB
ALBUMIN SERPL BCG-MCNC: 4.5 G/DL (ref 3.5–5.2)
ALP SERPL-CCNC: 58 U/L (ref 40–150)
ALT SERPL W P-5'-P-CCNC: 47 U/L (ref 0–70)
ANION GAP SERPL CALCULATED.3IONS-SCNC: 11 MMOL/L (ref 7–15)
AST SERPL W P-5'-P-CCNC: 38 U/L (ref 0–45)
BILIRUB SERPL-MCNC: 0.4 MG/DL
BUN SERPL-MCNC: 14.3 MG/DL (ref 6–20)
CALCIUM SERPL-MCNC: 9.3 MG/DL (ref 8.8–10.4)
CHLORIDE SERPL-SCNC: 105 MMOL/L (ref 98–107)
CHOLEST SERPL-MCNC: 96 MG/DL
CREAT SERPL-MCNC: 1.25 MG/DL (ref 0.67–1.17)
CREAT UR-MCNC: 282 MG/DL
EGFRCR SERPLBLD CKD-EPI 2021: 68 ML/MIN/1.73M2
FASTING STATUS PATIENT QL REPORTED: NO
FASTING STATUS PATIENT QL REPORTED: NO
GLUCOSE SERPL-MCNC: 81 MG/DL (ref 70–99)
HCO3 SERPL-SCNC: 24 MMOL/L (ref 22–29)
HDLC SERPL-MCNC: 34 MG/DL
LDLC SERPL CALC-MCNC: 46 MG/DL
MICROALBUMIN UR-MCNC: 16.7 MG/L
MICROALBUMIN/CREAT UR: 5.92 MG/G CR (ref 0–17)
NONHDLC SERPL-MCNC: 62 MG/DL
POTASSIUM SERPL-SCNC: 4.5 MMOL/L (ref 3.4–5.3)
PROT SERPL-MCNC: 7.1 G/DL (ref 6.4–8.3)
PSA SERPL DL<=0.01 NG/ML-MCNC: 1.22 NG/ML (ref 0–3.5)
SODIUM SERPL-SCNC: 140 MMOL/L (ref 135–145)
TRIGL SERPL-MCNC: 80 MG/DL

## 2025-08-02 ENCOUNTER — HEALTH MAINTENANCE LETTER (OUTPATIENT)
Age: 55
End: 2025-08-02